# Patient Record
Sex: FEMALE | Race: WHITE | Employment: UNEMPLOYED | URBAN - METROPOLITAN AREA
[De-identification: names, ages, dates, MRNs, and addresses within clinical notes are randomized per-mention and may not be internally consistent; named-entity substitution may affect disease eponyms.]

---

## 2020-06-10 ENCOUNTER — TELEPHONE (OUTPATIENT)
Dept: OBGYN CLINIC | Facility: CLINIC | Age: 30
End: 2020-06-10

## 2020-06-10 NOTE — TELEPHONE ENCOUNTER
The patient was suppose to be seen for a Missed Menses appointment yesterday but she could not urinate in the office to save her life so we sent her for a Quant BHCG. I don't know why she did not go to the lab when she was in our office yesterday.   Now yo

## 2020-06-10 NOTE — TELEPHONE ENCOUNTER
Pt Name and  verified. Pt states that she is unable to come in today for blood work to complete an HCG. Pt is scheduled to come in tomorrow, but wants to make sure she is able to be seen even though she didn't complete the lab.  pls advise

## 2020-06-11 ENCOUNTER — HOSPITAL ENCOUNTER (OUTPATIENT)
Dept: ULTRASOUND IMAGING | Facility: HOSPITAL | Age: 30
Discharge: HOME | End: 2020-06-11
Attending: OBSTETRICS & GYNECOLOGY
Payer: MEDICAID

## 2020-06-11 ENCOUNTER — OFFICE VISIT (OUTPATIENT)
Dept: OBGYN CLINIC | Facility: CLINIC | Age: 30
End: 2020-06-11
Payer: MEDICAID

## 2020-06-11 VITALS — SYSTOLIC BLOOD PRESSURE: 110 MMHG | DIASTOLIC BLOOD PRESSURE: 62 MMHG | WEIGHT: 192 LBS

## 2020-06-11 DIAGNOSIS — N92.6 MISSED MENSES: ICD-10-CM

## 2020-06-11 DIAGNOSIS — N92.6 MISSED MENSES: Primary | ICD-10-CM

## 2020-06-11 PROCEDURE — 76801 OB US < 14 WKS SINGLE FETUS: CPT | Performed by: OBSTETRICS & GYNECOLOGY

## 2020-06-11 PROCEDURE — 81025 URINE PREGNANCY TEST: CPT | Performed by: OBSTETRICS & GYNECOLOGY

## 2020-06-11 PROCEDURE — 99203 OFFICE O/P NEW LOW 30 MIN: CPT | Performed by: OBSTETRICS & GYNECOLOGY

## 2020-06-11 RX ORDER — SWAB
SWAB, NON-MEDICATED MISCELLANEOUS
COMMUNITY
End: 2020-06-12 | Stop reason: CLARIF

## 2020-06-11 NOTE — PROGRESS NOTES
HPI:    Patient ID: Akash Genao is a 34year old female. Patient here for PCV. Discussed PNC and PNV. Optional and required screening reviewed. H/O  x 2. No risk factors identified. Patient is sure of Dates. By LMP should be 11 weks.   No FHTs

## 2020-06-12 ENCOUNTER — TELEPHONE (OUTPATIENT)
Dept: OBGYN CLINIC | Facility: CLINIC | Age: 30
End: 2020-06-12

## 2020-06-12 RX ORDER — ASCORBIC ACID, CHOLECALCIFEROL, .ALPHA.-TOCOPHEROL, DL-, PYRIDOXINE HYDROCHLORIDE, FOLIC ACID, CYANOCOBALAMIN, CALCIUM CARBONATE, FERROUS FUMARATE, MAGNESIUM OXIDE AND DOCONEXENT 90; 220; 10; 26; 1; 13; 145; 28; 50; 300 MG/1; [IU]/1; [IU]/1; MG/1; MG/1; UG/1; MG/1; MG/1; MG/1; MG/1
1 CAPSULE, GELATIN COATED ORAL DAILY
Qty: 30 CAPSULE | Refills: 11 | Status: SHIPPED | OUTPATIENT
Start: 2020-06-12 | End: 2020-07-12

## 2020-06-12 NOTE — TELEPHONE ENCOUNTER
----- Message from Craig Cortes MD sent at 6/11/2020  7:12 PM CDT -----  Patient has 9 week IUP. Patient was off on her dates. New CHRISTIN is 1/14/2021. Notify patient.

## 2020-06-12 NOTE — TELEPHONE ENCOUNTER
Pt informed of results and new CHRISTIN date, verbalized understanding. Pt inquiring on prenatal vitamins, would like Md to send some to pharmacy. Sending to provider for consideration.

## 2020-06-17 ENCOUNTER — TELEPHONE (OUTPATIENT)
Dept: OBGYN CLINIC | Facility: CLINIC | Age: 30
End: 2020-06-17

## 2020-06-23 ENCOUNTER — TELEPHONE (OUTPATIENT)
Dept: OBGYN CLINIC | Facility: CLINIC | Age: 30
End: 2020-06-23

## 2020-06-23 NOTE — TELEPHONE ENCOUNTER
Spoke with pharmacist and they were requesting clarification for PNV. Wanted to know if okay to approve for covered medications by the pt's plan. Advised okay to give pt PNV approved by plan. No further questions.

## 2020-06-24 ENCOUNTER — NURSE ONLY (OUTPATIENT)
Dept: OBGYN CLINIC | Facility: CLINIC | Age: 30
End: 2020-06-24
Payer: MEDICAID

## 2020-06-24 VITALS — BODY MASS INDEX: 33 KG/M2 | HEIGHT: 64 IN

## 2020-06-24 DIAGNOSIS — Z34.81 ENCOUNTER FOR SUPERVISION OF OTHER NORMAL PREGNANCY IN FIRST TRIMESTER: Primary | ICD-10-CM

## 2020-06-24 NOTE — PROGRESS NOTES
Pt Name and  verified. OB History     T2    L2    SAB0  TAB0  Ectopic0  Multiple0  Live Births2       Pt is here today for RN [facility] Education.     Missed menses apt with:MLM  LMP: 3/23/2020    Pre  BMI: 32.6   EPDS score: 0/30  +UPT at

## 2020-07-20 ENCOUNTER — APPOINTMENT (OUTPATIENT)
Dept: MRI IMAGING | Facility: HOSPITAL | Age: 30
DRG: 818 | End: 2020-07-20
Attending: EMERGENCY MEDICINE
Payer: MEDICAID

## 2020-07-20 ENCOUNTER — ANESTHESIA EVENT (OUTPATIENT)
Dept: SURGERY | Facility: HOSPITAL | Age: 30
DRG: 818 | End: 2020-07-20
Payer: MEDICAID

## 2020-07-20 ENCOUNTER — ANESTHESIA (OUTPATIENT)
Dept: SURGERY | Facility: HOSPITAL | Age: 30
DRG: 818 | End: 2020-07-20
Payer: MEDICAID

## 2020-07-20 ENCOUNTER — APPOINTMENT (OUTPATIENT)
Dept: ULTRASOUND IMAGING | Facility: HOSPITAL | Age: 30
DRG: 818 | End: 2020-07-20
Attending: EMERGENCY MEDICINE
Payer: MEDICAID

## 2020-07-20 ENCOUNTER — HOSPITAL ENCOUNTER (INPATIENT)
Facility: HOSPITAL | Age: 30
LOS: 1 days | Discharge: HOME | DRG: 818 | End: 2020-07-21
Attending: EMERGENCY MEDICINE | Admitting: HOSPITALIST
Payer: MEDICAID

## 2020-07-20 DIAGNOSIS — K35.80 ACUTE APPENDICITIS, UNSPECIFIED ACUTE APPENDICITIS TYPE: Primary | ICD-10-CM

## 2020-07-20 DIAGNOSIS — K35.80 ACUTE APPENDICITIS: ICD-10-CM

## 2020-07-20 LAB
ALBUMIN SERPL-MCNC: 3 G/DL (ref 3.4–5)
ALBUMIN/GLOB SERPL: 0.8 {RATIO} (ref 1–2)
ALP LIVER SERPL-CCNC: 62 U/L (ref 37–98)
ALT SERPL-CCNC: 19 U/L (ref 13–56)
ANION GAP SERPL CALC-SCNC: 8 MMOL/L (ref 0–18)
ANTIBODY SCREEN: NEGATIVE
AST SERPL-CCNC: 12 U/L (ref 15–37)
B-HCG SERPL-ACNC: ABNORMAL MIU/ML
BASOPHILS # BLD AUTO: 0.02 X10(3) UL (ref 0–0.2)
BASOPHILS NFR BLD AUTO: 0.1 %
BILIRUB SERPL-MCNC: 0.4 MG/DL (ref 0.1–2)
BILIRUB UR QL: NEGATIVE
BUN BLD-MCNC: 7 MG/DL (ref 7–18)
BUN/CREAT SERPL: 13.2 (ref 10–20)
CALCIUM BLD-MCNC: 8.4 MG/DL (ref 8.5–10.1)
CHLORIDE SERPL-SCNC: 110 MMOL/L (ref 98–112)
CO2 SERPL-SCNC: 21 MMOL/L (ref 21–32)
COLOR UR: YELLOW
CREAT BLD-MCNC: 0.53 MG/DL (ref 0.55–1.02)
DEPRECATED RDW RBC AUTO: 42.7 FL (ref 35.1–46.3)
EOSINOPHIL # BLD AUTO: 0.03 X10(3) UL (ref 0–0.7)
EOSINOPHIL NFR BLD AUTO: 0.2 %
ERYTHROCYTE [DISTWIDTH] IN BLOOD BY AUTOMATED COUNT: 12.4 % (ref 11–15)
GLOBULIN PLAS-MCNC: 3.7 G/DL (ref 2.8–4.4)
GLUCOSE BLD-MCNC: 99 MG/DL (ref 70–99)
GLUCOSE UR-MCNC: NEGATIVE MG/DL
HAV IGM SER QL: 2 MG/DL (ref 1.6–2.6)
HCT VFR BLD AUTO: 39.7 % (ref 35–48)
HGB BLD-MCNC: 14.2 G/DL (ref 12–16)
HGB UR QL STRIP.AUTO: NEGATIVE
IMM GRANULOCYTES # BLD AUTO: 0.07 X10(3) UL (ref 0–1)
IMM GRANULOCYTES NFR BLD: 0.4 %
KETONES UR-MCNC: 20 MG/DL
LEUKOCYTE ESTERASE UR QL STRIP.AUTO: NEGATIVE
LIPASE SERPL-CCNC: 43 U/L (ref 73–393)
LYMPHOCYTES # BLD AUTO: 1.55 X10(3) UL (ref 1–4)
LYMPHOCYTES NFR BLD AUTO: 9.8 %
M PROTEIN MFR SERPL ELPH: 6.7 G/DL (ref 6.4–8.2)
MCH RBC QN AUTO: 33.7 PG (ref 26–34)
MCHC RBC AUTO-ENTMCNC: 35.8 G/DL (ref 31–37)
MCV RBC AUTO: 94.3 FL (ref 80–100)
MONOCYTES # BLD AUTO: 0.54 X10(3) UL (ref 0.1–1)
MONOCYTES NFR BLD AUTO: 3.4 %
NEUTROPHILS # BLD AUTO: 13.58 X10 (3) UL (ref 1.5–7.7)
NEUTROPHILS # BLD AUTO: 13.58 X10(3) UL (ref 1.5–7.7)
NEUTROPHILS NFR BLD AUTO: 86.1 %
NITRITE UR QL STRIP.AUTO: NEGATIVE
OSMOLALITY SERPL CALC.SUM OF ELEC: 286 MOSM/KG (ref 275–295)
PH UR: 6 [PH] (ref 5–8)
PLATELET # BLD AUTO: 218 10(3)UL (ref 150–450)
POTASSIUM SERPL-SCNC: 3.4 MMOL/L (ref 3.5–5.1)
PROT UR-MCNC: NEGATIVE MG/DL
RBC # BLD AUTO: 4.21 X10(6)UL (ref 3.8–5.3)
RH BLOOD TYPE: POSITIVE
SARS-COV-2 RNA RESP QL NAA+PROBE: NOT DETECTED
SODIUM SERPL-SCNC: 139 MMOL/L (ref 136–145)
SP GR UR STRIP: 1.01 (ref 1–1.03)
UROBILINOGEN UR STRIP-ACNC: <2
WBC # BLD AUTO: 15.8 X10(3) UL (ref 4–11)

## 2020-07-20 PROCEDURE — 0DTJ4ZZ RESECTION OF APPENDIX, PERCUTANEOUS ENDOSCOPIC APPROACH: ICD-10-PCS | Performed by: SURGERY

## 2020-07-20 PROCEDURE — 99222 1ST HOSP IP/OBS MODERATE 55: CPT | Performed by: OBSTETRICS & GYNECOLOGY

## 2020-07-20 PROCEDURE — 99222 1ST HOSP IP/OBS MODERATE 55: CPT | Performed by: HOSPITALIST

## 2020-07-20 PROCEDURE — 76815 OB US LIMITED FETUS(S): CPT | Performed by: EMERGENCY MEDICINE

## 2020-07-20 PROCEDURE — 74181 MRI ABDOMEN W/O CONTRAST: CPT | Performed by: EMERGENCY MEDICINE

## 2020-07-20 PROCEDURE — 72195 MRI PELVIS W/O DYE: CPT | Performed by: EMERGENCY MEDICINE

## 2020-07-20 RX ORDER — NALOXONE HYDROCHLORIDE 0.4 MG/ML
80 INJECTION, SOLUTION INTRAMUSCULAR; INTRAVENOUS; SUBCUTANEOUS AS NEEDED
Status: DISCONTINUED | OUTPATIENT
Start: 2020-07-20 | End: 2020-07-20 | Stop reason: HOSPADM

## 2020-07-20 RX ORDER — HYDROCODONE BITARTRATE AND ACETAMINOPHEN 5; 325 MG/1; MG/1
1 TABLET ORAL AS NEEDED
Status: DISCONTINUED | OUTPATIENT
Start: 2020-07-20 | End: 2020-07-20 | Stop reason: HOSPADM

## 2020-07-20 RX ORDER — DEXAMETHASONE SODIUM PHOSPHATE 4 MG/ML
VIAL (ML) INJECTION AS NEEDED
Status: DISCONTINUED | OUTPATIENT
Start: 2020-07-20 | End: 2020-07-20 | Stop reason: SURG

## 2020-07-20 RX ORDER — ONDANSETRON 2 MG/ML
4 INJECTION INTRAMUSCULAR; INTRAVENOUS EVERY 6 HOURS PRN
Status: DISCONTINUED | OUTPATIENT
Start: 2020-07-20 | End: 2020-07-21

## 2020-07-20 RX ORDER — MORPHINE SULFATE 4 MG/ML
4 INJECTION, SOLUTION INTRAMUSCULAR; INTRAVENOUS EVERY 10 MIN PRN
Status: DISCONTINUED | OUTPATIENT
Start: 2020-07-20 | End: 2020-07-20 | Stop reason: HOSPADM

## 2020-07-20 RX ORDER — METOCLOPRAMIDE HYDROCHLORIDE 5 MG/ML
10 INJECTION INTRAMUSCULAR; INTRAVENOUS EVERY 8 HOURS PRN
Status: DISCONTINUED | OUTPATIENT
Start: 2020-07-20 | End: 2020-07-21

## 2020-07-20 RX ORDER — SODIUM CHLORIDE 0.9 % (FLUSH) 0.9 %
3 SYRINGE (ML) INJECTION AS NEEDED
Status: DISCONTINUED | OUTPATIENT
Start: 2020-07-20 | End: 2020-07-21

## 2020-07-20 RX ORDER — SODIUM CHLORIDE, SODIUM LACTATE, POTASSIUM CHLORIDE, CALCIUM CHLORIDE 600; 310; 30; 20 MG/100ML; MG/100ML; MG/100ML; MG/100ML
INJECTION, SOLUTION INTRAVENOUS CONTINUOUS PRN
Status: DISCONTINUED | OUTPATIENT
Start: 2020-07-20 | End: 2020-07-20 | Stop reason: SURG

## 2020-07-20 RX ORDER — SODIUM CHLORIDE, SODIUM LACTATE, POTASSIUM CHLORIDE, CALCIUM CHLORIDE 600; 310; 30; 20 MG/100ML; MG/100ML; MG/100ML; MG/100ML
INJECTION, SOLUTION INTRAVENOUS CONTINUOUS
Status: DISCONTINUED | OUTPATIENT
Start: 2020-07-20 | End: 2020-07-20 | Stop reason: HOSPADM

## 2020-07-20 RX ORDER — PROCHLORPERAZINE EDISYLATE 5 MG/ML
5 INJECTION INTRAMUSCULAR; INTRAVENOUS ONCE AS NEEDED
Status: DISCONTINUED | OUTPATIENT
Start: 2020-07-20 | End: 2020-07-20 | Stop reason: HOSPADM

## 2020-07-20 RX ORDER — ENOXAPARIN SODIUM 100 MG/ML
40 INJECTION SUBCUTANEOUS DAILY
Status: DISCONTINUED | OUTPATIENT
Start: 2020-07-20 | End: 2020-07-21

## 2020-07-20 RX ORDER — HEPARIN SODIUM 1000 [USP'U]/ML
INJECTION, SOLUTION INTRAVENOUS; SUBCUTANEOUS AS NEEDED
Status: DISCONTINUED | OUTPATIENT
Start: 2020-07-20 | End: 2020-07-20 | Stop reason: HOSPADM

## 2020-07-20 RX ORDER — MORPHINE SULFATE 4 MG/ML
4 INJECTION, SOLUTION INTRAMUSCULAR; INTRAVENOUS EVERY 2 HOUR PRN
Status: DISCONTINUED | OUTPATIENT
Start: 2020-07-20 | End: 2020-07-21

## 2020-07-20 RX ORDER — DEXTROSE, SODIUM CHLORIDE, SODIUM LACTATE, POTASSIUM CHLORIDE, AND CALCIUM CHLORIDE 5; .6; .31; .03; .02 G/100ML; G/100ML; G/100ML; G/100ML; G/100ML
INJECTION, SOLUTION INTRAVENOUS CONTINUOUS
Status: DISCONTINUED | OUTPATIENT
Start: 2020-07-20 | End: 2020-07-21

## 2020-07-20 RX ORDER — GLYCOPYRROLATE 0.2 MG/ML
INJECTION, SOLUTION INTRAMUSCULAR; INTRAVENOUS AS NEEDED
Status: DISCONTINUED | OUTPATIENT
Start: 2020-07-20 | End: 2020-07-20 | Stop reason: SURG

## 2020-07-20 RX ORDER — ONDANSETRON 2 MG/ML
4 INJECTION INTRAMUSCULAR; INTRAVENOUS ONCE AS NEEDED
Status: DISCONTINUED | OUTPATIENT
Start: 2020-07-20 | End: 2020-07-20 | Stop reason: HOSPADM

## 2020-07-20 RX ORDER — MORPHINE SULFATE 2 MG/ML
1 INJECTION, SOLUTION INTRAMUSCULAR; INTRAVENOUS EVERY 2 HOUR PRN
Status: DISCONTINUED | OUTPATIENT
Start: 2020-07-20 | End: 2020-07-21

## 2020-07-20 RX ORDER — HYDROMORPHONE HYDROCHLORIDE 1 MG/ML
0.6 INJECTION, SOLUTION INTRAMUSCULAR; INTRAVENOUS; SUBCUTANEOUS EVERY 5 MIN PRN
Status: DISCONTINUED | OUTPATIENT
Start: 2020-07-20 | End: 2020-07-20 | Stop reason: HOSPADM

## 2020-07-20 RX ORDER — LIDOCAINE HYDROCHLORIDE 10 MG/ML
INJECTION, SOLUTION EPIDURAL; INFILTRATION; INTRACAUDAL; PERINEURAL AS NEEDED
Status: DISCONTINUED | OUTPATIENT
Start: 2020-07-20 | End: 2020-07-20 | Stop reason: SURG

## 2020-07-20 RX ORDER — CHOLECALCIFEROL (VITAMIN D3) 25 MCG
1 TABLET,CHEWABLE ORAL DAILY
Status: DISCONTINUED | OUTPATIENT
Start: 2020-07-20 | End: 2020-07-21

## 2020-07-20 RX ORDER — .BETA.-CAROTENE, ASCORBIC ACID, CHOLECALCIFEROL, .ALPHA.-TOCOPHEROL ACETATE, DL-, THIAMINE, RIBOFLAVIN, NIACINAMIDE, PYRIDOXINE HYDROCHLORIDE, FOLIC ACID, CYANOCOBALAMIN, CALCIUM PANTOTHENATE, CALCIUM CARBONATE, FERROUS FUMARATE, ZINC OXIDE AND DOCUSATE SODIUM 1000; 100; 400; 30; 3; 3; 15; 20; 1; 12; 7; 200; 29; 20; 25 [IU]/1; MG/1; [IU]/1; MG/1; MG/1; MG/1; MG/1; MG/1; MG/1; UG/1; MG/1; MG/1; MG/1; MG/1; MG/1
1 TABLET ORAL DAILY
COMMUNITY
Start: 2020-06-23 | End: 2021-01-11

## 2020-07-20 RX ORDER — HYDROMORPHONE HYDROCHLORIDE 1 MG/ML
0.4 INJECTION, SOLUTION INTRAMUSCULAR; INTRAVENOUS; SUBCUTANEOUS EVERY 5 MIN PRN
Status: DISCONTINUED | OUTPATIENT
Start: 2020-07-20 | End: 2020-07-20 | Stop reason: HOSPADM

## 2020-07-20 RX ORDER — ACETAMINOPHEN 325 MG/1
650 TABLET ORAL EVERY 4 HOURS PRN
Status: DISCONTINUED | OUTPATIENT
Start: 2020-07-20 | End: 2020-07-21

## 2020-07-20 RX ORDER — FAMOTIDINE 10 MG/ML
20 INJECTION, SOLUTION INTRAVENOUS ONCE
Status: COMPLETED | OUTPATIENT
Start: 2020-07-20 | End: 2020-07-20

## 2020-07-20 RX ORDER — MORPHINE SULFATE 4 MG/ML
2 INJECTION, SOLUTION INTRAMUSCULAR; INTRAVENOUS EVERY 10 MIN PRN
Status: DISCONTINUED | OUTPATIENT
Start: 2020-07-20 | End: 2020-07-20 | Stop reason: HOSPADM

## 2020-07-20 RX ORDER — ROCURONIUM BROMIDE 10 MG/ML
INJECTION, SOLUTION INTRAVENOUS AS NEEDED
Status: DISCONTINUED | OUTPATIENT
Start: 2020-07-20 | End: 2020-07-20 | Stop reason: SURG

## 2020-07-20 RX ORDER — HYDROCODONE BITARTRATE AND ACETAMINOPHEN 5; 325 MG/1; MG/1
2 TABLET ORAL AS NEEDED
Status: DISCONTINUED | OUTPATIENT
Start: 2020-07-20 | End: 2020-07-20 | Stop reason: HOSPADM

## 2020-07-20 RX ORDER — NEOSTIGMINE METHYLSULFATE 1 MG/ML
INJECTION INTRAVENOUS AS NEEDED
Status: DISCONTINUED | OUTPATIENT
Start: 2020-07-20 | End: 2020-07-20 | Stop reason: SURG

## 2020-07-20 RX ORDER — BUPIVACAINE HYDROCHLORIDE AND EPINEPHRINE 2.5; 5 MG/ML; UG/ML
INJECTION, SOLUTION INFILTRATION; PERINEURAL AS NEEDED
Status: DISCONTINUED | OUTPATIENT
Start: 2020-07-20 | End: 2020-07-20 | Stop reason: HOSPADM

## 2020-07-20 RX ORDER — DEXTROSE AND SODIUM CHLORIDE 5; .45 G/100ML; G/100ML
INJECTION, SOLUTION INTRAVENOUS CONTINUOUS
Status: ACTIVE | OUTPATIENT
Start: 2020-07-20 | End: 2020-07-20

## 2020-07-20 RX ORDER — MORPHINE SULFATE 10 MG/ML
6 INJECTION, SOLUTION INTRAMUSCULAR; INTRAVENOUS EVERY 10 MIN PRN
Status: DISCONTINUED | OUTPATIENT
Start: 2020-07-20 | End: 2020-07-20 | Stop reason: HOSPADM

## 2020-07-20 RX ORDER — HYDROCODONE BITARTRATE AND ACETAMINOPHEN 5; 325 MG/1; MG/1
2 TABLET ORAL EVERY 4 HOURS PRN
Status: DISCONTINUED | OUTPATIENT
Start: 2020-07-20 | End: 2020-07-21

## 2020-07-20 RX ORDER — HYDROCODONE BITARTRATE AND ACETAMINOPHEN 5; 325 MG/1; MG/1
1 TABLET ORAL EVERY 4 HOURS PRN
Status: DISCONTINUED | OUTPATIENT
Start: 2020-07-20 | End: 2020-07-21

## 2020-07-20 RX ORDER — METOCLOPRAMIDE HYDROCHLORIDE 5 MG/ML
10 INJECTION INTRAMUSCULAR; INTRAVENOUS ONCE
Status: COMPLETED | OUTPATIENT
Start: 2020-07-20 | End: 2020-07-20

## 2020-07-20 RX ORDER — ONDANSETRON 2 MG/ML
INJECTION INTRAMUSCULAR; INTRAVENOUS AS NEEDED
Status: DISCONTINUED | OUTPATIENT
Start: 2020-07-20 | End: 2020-07-20 | Stop reason: SURG

## 2020-07-20 RX ORDER — HYDROMORPHONE HYDROCHLORIDE 1 MG/ML
0.2 INJECTION, SOLUTION INTRAMUSCULAR; INTRAVENOUS; SUBCUTANEOUS EVERY 5 MIN PRN
Status: DISCONTINUED | OUTPATIENT
Start: 2020-07-20 | End: 2020-07-20 | Stop reason: HOSPADM

## 2020-07-20 RX ORDER — MORPHINE SULFATE 2 MG/ML
2 INJECTION, SOLUTION INTRAMUSCULAR; INTRAVENOUS EVERY 2 HOUR PRN
Status: DISCONTINUED | OUTPATIENT
Start: 2020-07-20 | End: 2020-07-21

## 2020-07-20 RX ADMIN — LIDOCAINE HYDROCHLORIDE 50 MG: 10 INJECTION, SOLUTION EPIDURAL; INFILTRATION; INTRACAUDAL; PERINEURAL at 13:41:00

## 2020-07-20 RX ADMIN — SODIUM CHLORIDE, SODIUM LACTATE, POTASSIUM CHLORIDE, CALCIUM CHLORIDE: 600; 310; 30; 20 INJECTION, SOLUTION INTRAVENOUS at 13:40:00

## 2020-07-20 RX ADMIN — GLYCOPYRROLATE 0.2 MG: 0.2 INJECTION, SOLUTION INTRAMUSCULAR; INTRAVENOUS at 13:36:00

## 2020-07-20 RX ADMIN — ONDANSETRON 4 MG: 2 INJECTION INTRAMUSCULAR; INTRAVENOUS at 14:22:00

## 2020-07-20 RX ADMIN — SODIUM CHLORIDE, SODIUM LACTATE, POTASSIUM CHLORIDE, CALCIUM CHLORIDE: 600; 310; 30; 20 INJECTION, SOLUTION INTRAVENOUS at 13:37:00

## 2020-07-20 RX ADMIN — NEOSTIGMINE METHYLSULFATE 3.5 MG: 1 INJECTION INTRAVENOUS at 14:27:00

## 2020-07-20 RX ADMIN — DEXAMETHASONE SODIUM PHOSPHATE 8 MG: 4 MG/ML VIAL (ML) INJECTION at 13:52:00

## 2020-07-20 RX ADMIN — GLYCOPYRROLATE 0.7 MG: 0.2 INJECTION, SOLUTION INTRAMUSCULAR; INTRAVENOUS at 14:27:00

## 2020-07-20 RX ADMIN — ROCURONIUM BROMIDE 30 MG: 10 INJECTION, SOLUTION INTRAVENOUS at 13:43:00

## 2020-07-20 NOTE — ED NOTES
Orders for admission. Patient and/or next of kin aware of plan and is ready to go upstairs. Please call ED RN at listed extension should you have any further questions or concerns. Thank you.     Nurse and Najma Morton    Chief Presentation: ABDOMINAL PA

## 2020-07-20 NOTE — ED NOTES
Orders for admission, patient is aware of plan and ready to go upstairs. Any questions, please call ED RN Hair Arteaga  at extension [phone]. Pt is New Zealander speaking only.   Potassium Iv slowed down due to pt stating she is feeling pain, IV flush without difficulty

## 2020-07-20 NOTE — OPERATIVE REPORT
OPERATIVE REPORT:     PATIENT NAME: Akash Genao  : 1990   CSN: [id number]    DATE OF OPERATION:   20    PREOPERATIVE DIAGNOSIS: Acute appendicitis    POSTOPERATIVE DIAGNOSIS: Acute appendicitis     PROCEDURE PERFORMED: Laparoscopic appendecto ports were removed under direct visualization and then the umbilical fascial incision was closed using 3 simple interrupted 0 Vicryl sutures.   Local anesthetic was infiltrated at the fascial level the wounds were irrigated and closed using 4-0 Vicryl subic

## 2020-07-20 NOTE — CONSULTS
[address]  ZWX:60/01/0723  [facility]:[id number]  LOS:0    Date of Admission:  7/20/2020  Date of Consult:  7/20/2020     Rosario Gonzalez HYDROcodone-acetaminophen (NORCO) 5-325 MG per tab 2 tablet, 2 tablet, Oral, Q4H PRN  •  morphINE sulfate (PF) 2 MG/ML injection 1 mg, 1 mg, Intravenous, Q2H PRN **OR** morphINE sulfate (PF) 2 MG/ML injection 2 mg, 2 mg, Intravenous, Q2H PRN **OR** morphIN 07/20/2020    .0 07/20/2020     07/20/2020    K 3.4 07/20/2020     07/20/2020    CO2 21.0 07/20/2020    BUN 7 07/20/2020    CREATSERUM 0.53 07/20/2020    GLU 99 07/20/2020    MG 2.0 07/20/2020    BILT 0.4 07/20/2020    AST 12 07/20/2020 safe than nonsurgical treatment. He understood risk for premature labor and spontaneous  with either treatment.   He understood that there is no risk of birth defects with surgery in second trimester pregnancy, although risk of birth defects still

## 2020-07-20 NOTE — ANESTHESIA POSTPROCEDURE EVALUATION
Patient: Rowan Campos    Procedure Summary     Date:  07/20/20 Room / Location:  [facility] OR [address] [place] [address] OR    Anesthesia Start:  7771 Anesthesia Stop:  6449    Procedure:  LAPAROSCOPIC APPENDECTOMY (N/A Abdomen) Diagnosis:       Acute appendicitis      (

## 2020-07-20 NOTE — PLAN OF CARE
Pt received from ED around 0900 and went for lap appendectomy this afternoon. Pt is primarily [address] speaking. Pt is A&Ox4, VSS on room air. Complaining of mild to moderate pain throughout the day, declined medication.  IV fluids infusing, scheduled zosyn evaluate response  - Consider cultural and social influences on pain and pain management  - Manage/alleviate anxiety  - Utilize distraction and/or relaxation techniques  - Monitor for opioid side effects  - Notify MD/LIP if interventions unsuccessful or pa Minimal or absence of nausea and vomiting  Description  INTERVENTIONS:  - Maintain adequate hydration with IV or PO as ordered and tolerated  - Nasogastric tube to low intermittent suction as ordered  - Evaluate effectiveness of ordered antiemetic medicati

## 2020-07-20 NOTE — ANESTHESIA PREPROCEDURE EVALUATION
Anesthesia PreOp Note    HPI:     Ashley Pérez is a 34year old female who presents for preoperative consultation requested by: Pedro Hess MD    Date of Surgery: 7/20/2020    Procedure(s):  LAPAROSCOPIC APPENDECTOMY  Indication: Acute appendicitis [ (PF) 2 MG/ML injection 2 mg, 2 mg, Intravenous, Q2H PRN, Eri Hull MD    Or  [facility] Hold] morphINE sulfate (PF) 4 MG/ML injection 4 mg, 4 mg, Intravenous, Q2H PRN, Eri Almeida MD  [MAR Hold] ondansetron HCl (ZOFRAN) injection 4 mg, 4 mg, Intraven Not on file        Physically abused: Not on file        Forced sexual activity: Not on file    Other Topics      Concerns:        Not on file    Social History Narrative      Not on file      Available pre-op labs reviewed.   Lab Results   Component Value ASA:  2  Emergent    Plan:   General  Airway:  ETT and Video laryngoscope  Plan Comments: 14 weeks pregnant. Pre and post-op FHTs. Avoid midazolam, toradol. Videolaryngoscopy + RSI. Plan interpreted by pre-op bedside nurse.   Informed Consent Plan and

## 2020-07-20 NOTE — ED INITIAL ASSESSMENT (HPI)
Pt arrives via ems with complaints of mid-abdominal pain that began 5 hours pta along with n/d. Pt states the pain radiates bilaterally in the lower abdomen. Pt is presently pregnant approximately 13-14 weeks with her 3rd pregnancy.  Pt is guarding abdomen

## 2020-07-20 NOTE — ANESTHESIA PROCEDURE NOTES
Airway  Date/Time: 7/20/2020 1:50 PM  Urgency: Elective    Airway not difficult    General Information and Staff    Patient location during procedure: OR  Anesthesiologist: Marsha Kincaid MD  Performed: anesthesiologist     Indications and Patient Conditi

## 2020-07-20 NOTE — H&P
[address] Patient Status:  Inpatient    1990 MRN [id number]   Location [facility] 4W/SW/SE Attending Wilfred Munoz MD   Hosp Day # 0 PCP Edis Escoto MD     Date:  2020  [facility] Calm and cooperative   HEENT:  Head was atraumatic and normocephalic. Eyes:  Extraocular muscles were intact. Sclera was anicteric. Pupils were equally reactive to light. Ears: There were no lesions. Nose:  No lesions were noted. Throat:   There was appendicitis with trace periappendiceal fluid. 2. 2.4 cm gallstone. 3. Few tiny hepatic cysts. No major discrepancy with preliminary [facility] radiology report.   Dictated by (CST): Izzy Menjivar MD on 7/20/2020 at 7:22 AM     Finalized by (CST): Ama Pulido

## 2020-07-20 NOTE — PROGRESS NOTES
[facility]    Obstetric ER Problem    Ian Forde Patient Status:  Emergency    1990 MRN [id number]   Location [address] Attending Korin Rhoades MD   Hosp Day # 0 PCP Eryn Hastings MD     Date of history.     Past OB History:  OB History    Para Term  AB Living   3 2 2     2   SAB TAB Ectopic Multiple Live Births           2      # Outcome Date GA Lbr Bobo/2nd Weight Sex Delivery Anes PTL Lv   3 Current            2 Term 17   9 lb Right: No inguinal adenopathy present. Left: No inguinal adenopathy present. Neurological: She is alert. Skin: Skin is warm.    Psychiatric: Her behavior is normal. Judgment normal.       Results:   Diagnostics:  Mri Abdomen/pelvis  (cpt=74181/72

## 2020-07-20 NOTE — ED PROVIDER NOTES
Patient Seen in: [facility] AND [facility] Emergency Department    History   Patient presents with:  Abdomen/Flank Pain  Pregnancy Issues  Nausea/Vomiting/Diarrhea    Stated Complaint: ABDOMINAL PAIN, PREGNANT     HPI    41-year-old female currently  approxima Exam   Constitutional: No distress. HEENT: MMM. Head: Normocephalic. Eyes: No injection. Cardiovascular: RRR. Pulmonary/Chest: Effort normal. CTAB. Abdominal: Soft. Gravid. Mild diffuse mid mid-abdominal tenderness without peritonitis.   Musculo 07/20/2020  Patient No:  [id number]  Physician:  Tressa Bowles  YOB: 1990    Past Medical History (entered by Technologist):    Reason For Exam (entered by Technologist):  MID ABD PAIN  Other Notes (entered by Technologist): SINGLE V [id number]  Physician:  Memo Maher  YOB: 1990    Past Medical History (entered by Technologist):    Reason For Exam (entered by Technologist):  13-15 weeks pregnancy  Other Notes (entered by Technologist):  Abdominal pain    Additio eventually with RLQ localization. Ultrasound without acute obstetric pathology, MRI notable for uncomplicated appendicitis for which Zosyn initiated.   Case discussed with OB coverage Dr. Terese Donovan for Dr. Kelsie Feredman - in agreement with antibiotics and need for o

## 2020-07-21 VITALS
HEIGHT: 64 IN | BODY MASS INDEX: 33.44 KG/M2 | RESPIRATION RATE: 18 BRPM | HEART RATE: 83 BPM | TEMPERATURE: 99 F | OXYGEN SATURATION: 100 % | DIASTOLIC BLOOD PRESSURE: 61 MMHG | WEIGHT: 195.88 LBS | SYSTOLIC BLOOD PRESSURE: 95 MMHG

## 2020-07-21 LAB
ANION GAP SERPL CALC-SCNC: 8 MMOL/L (ref 0–18)
BASOPHILS # BLD AUTO: 0.01 X10(3) UL (ref 0–0.2)
BASOPHILS NFR BLD AUTO: 0.1 %
BUN BLD-MCNC: 6 MG/DL (ref 7–18)
BUN/CREAT SERPL: 11.8 (ref 10–20)
CALCIUM BLD-MCNC: 8 MG/DL (ref 8.5–10.1)
CHLORIDE SERPL-SCNC: 110 MMOL/L (ref 98–112)
CO2 SERPL-SCNC: 22 MMOL/L (ref 21–32)
CREAT BLD-MCNC: 0.51 MG/DL (ref 0.55–1.02)
DEPRECATED RDW RBC AUTO: 42.5 FL (ref 35.1–46.3)
EOSINOPHIL # BLD AUTO: 0 X10(3) UL (ref 0–0.7)
EOSINOPHIL NFR BLD AUTO: 0 %
ERYTHROCYTE [DISTWIDTH] IN BLOOD BY AUTOMATED COUNT: 12.4 % (ref 11–15)
GLUCOSE BLD-MCNC: 124 MG/DL (ref 70–99)
HCT VFR BLD AUTO: 37 % (ref 35–48)
HGB BLD-MCNC: 13 G/DL (ref 12–16)
IMM GRANULOCYTES # BLD AUTO: 0.05 X10(3) UL (ref 0–1)
IMM GRANULOCYTES NFR BLD: 0.4 %
LYMPHOCYTES # BLD AUTO: 1.42 X10(3) UL (ref 1–4)
LYMPHOCYTES NFR BLD AUTO: 12.4 %
MCH RBC QN AUTO: 33.6 PG (ref 26–34)
MCHC RBC AUTO-ENTMCNC: 35.1 G/DL (ref 31–37)
MCV RBC AUTO: 95.6 FL (ref 80–100)
MONOCYTES # BLD AUTO: 0.48 X10(3) UL (ref 0.1–1)
MONOCYTES NFR BLD AUTO: 4.2 %
NEUTROPHILS # BLD AUTO: 9.52 X10 (3) UL (ref 1.5–7.7)
NEUTROPHILS # BLD AUTO: 9.52 X10(3) UL (ref 1.5–7.7)
NEUTROPHILS NFR BLD AUTO: 82.9 %
OSMOLALITY SERPL CALC.SUM OF ELEC: 289 MOSM/KG (ref 275–295)
PLATELET # BLD AUTO: 219 10(3)UL (ref 150–450)
POTASSIUM SERPL-SCNC: 3.7 MMOL/L (ref 3.5–5.1)
RBC # BLD AUTO: 3.87 X10(6)UL (ref 3.8–5.3)
SODIUM SERPL-SCNC: 140 MMOL/L (ref 136–145)
WBC # BLD AUTO: 11.5 X10(3) UL (ref 4–11)

## 2020-07-21 PROCEDURE — 99239 HOSP IP/OBS DSCHRG MGMT >30: CPT | Performed by: HOSPITALIST

## 2020-07-21 PROCEDURE — 99231 SBSQ HOSP IP/OBS SF/LOW 25: CPT | Performed by: OBSTETRICS & GYNECOLOGY

## 2020-07-21 RX ORDER — AMOXICILLIN AND CLAVULANATE POTASSIUM 875; 125 MG/1; MG/1
1 TABLET, FILM COATED ORAL 2 TIMES DAILY
Qty: 6 TABLET | Refills: 0 | Status: SHIPPED | OUTPATIENT
Start: 2020-07-21 | End: 2020-07-24

## 2020-07-21 RX ORDER — HYDROCODONE BITARTRATE AND ACETAMINOPHEN 5; 325 MG/1; MG/1
1 TABLET ORAL EVERY 4 HOURS PRN
Qty: 5 TABLET | Refills: 0 | Status: SHIPPED | OUTPATIENT
Start: 2020-07-21 | End: 2020-07-28

## 2020-07-21 NOTE — PROGRESS NOTES
[facility]    OB/GYNE Progress Note      Sade Cordova Patient Status:  Inpatient    1990 MRN [id number]   Location [facility] 4W/SW/SE Attending Jay Gamboa MD   Hosp Day # 1 PCP Shefali Conroy MD       Subjective Darrel Chiu MD  7/21/2020  9:24 AM

## 2020-07-21 NOTE — PLAN OF CARE
Tolerating general diet, no nausea. Passing gas. Lap sites clean/dry/intact. Walking independently. IVF infusing, zosyn. Has mild abdominal pain, declining pain medications. FBC RN evaluated fetal heart tones, WNL. Ok to discharge home today.  Discharge

## 2020-07-21 NOTE — PROGRESS NOTES
[facility]    General Surgery Progress Note  Alexander Isaac  : 1990  CSN: [id number]  HD# 1    Subjective:   POD#1 laparoscopic appendectomy   Denies complaints.  denies abdominal pain, nausea and vomiting  Passing flatus     Exam: 3.7 07/21/2020     07/21/2020    CO2 22.0 07/21/2020    BUN 6 07/21/2020    CREATSERUM 0.51 07/21/2020     07/21/2020    CA 8.0 07/21/2020       Us Pregnancy Ltd (cpt=76815)    Result Date: 7/20/2020  CONCLUSION:  1.  Single live fetus  breech Loss

## 2020-07-21 NOTE — PLAN OF CARE
Problem: Patient/Family Goals  Goal: Patient/Family Long Term Goal  Description  Patient's Long Term Goal: Return home    Interventions:  - Potential surgery; surgery on consult  - Pain control  - N/V control  - See additional Care Plan goals for specifi Assess pt frequently for physical needs  - Identify cognitive and physical deficits and behaviors that affect risk of falls.   - [place] fall precautions as indicated by assessment.  - Educate pt/family on patient safety including physical limitations  - returns to baseline bowel function  Description  INTERVENTIONS:  - Assess bowel function  - Maintain adequate hydration with IV or PO as ordered and tolerated  - Evaluate effectiveness of GI medications  - Encourage mobilization and activity  - Obtain nutr

## 2020-07-21 NOTE — PROGRESS NOTES
Doppler used at bedside to auscultate fetal heart tones: 152 bpm with moderate variability, no decels auscultated.

## 2020-07-22 NOTE — DISCHARGE SUMMARY
Mikayla Ramires 44 NAME: Nella Molina PHYSICIAN: Elian Miranda MD   PATIENT ACCOUNT#:   [id number]    LOCATION:  [address] [place] [address] RECORD #:   [id number]       YOB: 1990  ADMISSION DATE:       07/20/202 pressure 96/55, saturating at 94% on room air. GENERAL:  The patient lying in bed, appears to be in no acute distress at this time. She is A and O x3. HEENT:  Extraocular movements are intact.   Pupils equal, round, and reactive to light and accommodatio

## 2020-07-22 NOTE — PAYOR COMM NOTE
--------------  ADMISSION REVIEW     Beto Ulrich #:  [id number]  Authorization Number: [id number]    Admit date: 7/20/20  Admit time: 1412       Admitting Physician: Akilah Eason MD  Attending Physician:  No att. providers found  Primar All other systems reviewed and negative except as noted above. PSFH elements reviewed from today and agreed except as otherwise stated in HPI.     Physical Exam     ED Triage Vitals [07/20/20 0320]   /68   Pulse 85   Resp 17   Temp 99.1 °F (37.3 °C) Placenta is anterior with incidental posterior contraction. Cervix is unremarkable. Amniotic fluid is within normal limits. No adnexal mass. No acute findings.   Noncontrast MRI of the abdomen and pelvis  IMPRESSION:  Acute appendicitis, appendix is a I was wearing at minimum a facemask and eye protection throughout this encounter with handwashing performed prior and after patient evaluation without personal hand/facial/oropharyngeal contact and gloves worn throughout encounter.  See note and/or contact Blood pressure 100/66, pulse 91, temperature 98.7 °F (37.1 °C), temperature source Oral, resp.  rate 16, height 5' 4\" (1.626 m), weight 195 lb 14.4 oz (88.9 kg), last menstrual period 03/23/2020, SpO2 100 %     GENERAL:  The patient appeared to be in no di CONCLUSION:  1. Single live fetus  breech presentation approximately 15 weeks 3 days without complication. 2. Normal progression of growth from June 11, 2020. 3. A preliminary report was submitted and there is agreement without major discrepancies. The patient was brought to the operating room and was induced under general anesthesia. The abdomen was prepped and draped in the usual sterile fashion.   Local anesthesia was infiltrated in the umbilicus and an intra umbilical incision was made, the fasci HGB 14.2 13.0   HCT 39.7 37.0   MCV 94.3 95.6   MCH 33.7 33.6   MCHC 35.8 35.1   RDW 12.4 12.4   NEPRELIM 13.58* 9.52*   WBC 15.8* 11.5*   .0 219.0     GLU 99 124*   BUN 7 6*   CREATSERUM 0.53* 0.51*   GFRAA 148 150   GFRNAA 129 130   CA 8.4* 8.0* Piperacillin Sod-Tazobactam So (ZOSYN) 3.375 g in dextrose 5 % 100 mL ADD-vantage   Dose: 3.375 g  Freq: Every 8 hours Route: IV  Last Dose: 3.375 g (07/21/20 0528)  Start: 07/20/20 1300 End: 07/21/20 1633    Order specific questions:    Which infection is 1036-Given   1633-D/C'd            bupivacaine 0.25%-EPINEPHrine 1:200,000 (MARCAINE/EPINEPHRINE) injection   Freq: As needed  Start: 07/20/20 1428 End: 07/20/20 1445    1428-Given   1445-D/C'd             fentaNYL citrate (SUBLIMAZE) 0.05 MG/ML injection

## 2020-07-22 NOTE — PAYOR COMM NOTE
--------------  DISCHARGE REVIEW    Mariana Mancia #:  [id number]  Authorization Number: [id number]    Admit date: 7/20/20  Admit time:  3236  Discharge Date: 7/21/2020  2:32 PM     Admitting Physician: Loretta Blancas MD  Attending Physician been deemed stable to be discharged home. She will receive a 3-day course of Augmentin. She will also follow up with her OB within 1 week. All of the above information was given to the patient. She verbalized understanding of the information given.   Fo

## 2020-07-29 ENCOUNTER — TELEPHONE (OUTPATIENT)
Dept: OBGYN CLINIC | Facility: CLINIC | Age: 30
End: 2020-07-29

## 2020-07-29 NOTE — TELEPHONE ENCOUNTER
07/29/20 [facility], for pt to call back. Pt has over due PN labs that are pending from her Nurse education. Pt needs to go to the lab as soon as possible.

## 2020-07-30 NOTE — TELEPHONE ENCOUNTER
Pt Name and  verified. Pt informed that she needs to complete her OB initial lab work. States that she had an issue with her insurance and had to fix it in order for her to be able to complete her lab work.  Pt also scheduled her New OB apt with TRINIDAD mackay

## 2020-08-01 ENCOUNTER — LAB ENCOUNTER (OUTPATIENT)
Dept: LAB | Age: 30
End: 2020-08-01
Attending: OBSTETRICS & GYNECOLOGY
Payer: MEDICAID

## 2020-08-01 DIAGNOSIS — Z34.81 ENCOUNTER FOR SUPERVISION OF OTHER NORMAL PREGNANCY IN FIRST TRIMESTER: ICD-10-CM

## 2020-08-01 DIAGNOSIS — N92.6 MISSED MENSES: ICD-10-CM

## 2020-08-01 LAB
ANTIBODY SCREEN: NEGATIVE
B-HCG SERPL-ACNC: ABNORMAL MIU/ML
BASOPHILS # BLD AUTO: 0.02 X10(3) UL (ref 0–0.2)
BASOPHILS NFR BLD AUTO: 0.2 %
DEPRECATED RDW RBC AUTO: 43.8 FL (ref 35.1–46.3)
EOSINOPHIL # BLD AUTO: 0.05 X10(3) UL (ref 0–0.7)
EOSINOPHIL NFR BLD AUTO: 0.6 %
ERYTHROCYTE [DISTWIDTH] IN BLOOD BY AUTOMATED COUNT: 12.4 % (ref 11–15)
GLUCOSE 1H P GLC SERPL-MCNC: 124 MG/DL
HBV SURFACE AG SER-ACNC: <0.1 [IU]/L
HBV SURFACE AG SERPL QL IA: NONREACTIVE
HCT VFR BLD AUTO: 39.6 % (ref 35–48)
HGB BLD-MCNC: 13.7 G/DL (ref 12–16)
IMM GRANULOCYTES # BLD AUTO: 0.02 X10(3) UL (ref 0–1)
IMM GRANULOCYTES NFR BLD: 0.2 %
LYMPHOCYTES # BLD AUTO: 1.79 X10(3) UL (ref 1–4)
LYMPHOCYTES NFR BLD AUTO: 21.9 %
MCH RBC QN AUTO: 33.3 PG (ref 26–34)
MCHC RBC AUTO-ENTMCNC: 34.6 G/DL (ref 31–37)
MCV RBC AUTO: 96.4 FL (ref 80–100)
MONOCYTES # BLD AUTO: 0.32 X10(3) UL (ref 0.1–1)
MONOCYTES NFR BLD AUTO: 3.9 %
NEUTROPHILS # BLD AUTO: 5.98 X10 (3) UL (ref 1.5–7.7)
NEUTROPHILS # BLD AUTO: 5.98 X10(3) UL (ref 1.5–7.7)
NEUTROPHILS NFR BLD AUTO: 73.2 %
PLATELET # BLD AUTO: 231 10(3)UL (ref 150–450)
RBC # BLD AUTO: 4.11 X10(6)UL (ref 3.8–5.3)
RH BLOOD TYPE: POSITIVE
RUBV IGG SER QL: POSITIVE
RUBV IGG SER-ACNC: 245.9 IU/ML (ref 10–?)
WBC # BLD AUTO: 8.2 X10(3) UL (ref 4–11)

## 2020-08-01 PROCEDURE — 82950 GLUCOSE TEST: CPT

## 2020-08-01 PROCEDURE — 87389 HIV-1 AG W/HIV-1&-2 AB AG IA: CPT

## 2020-08-01 PROCEDURE — 86780 TREPONEMA PALLIDUM: CPT

## 2020-08-01 PROCEDURE — 86850 RBC ANTIBODY SCREEN: CPT

## 2020-08-01 PROCEDURE — 36415 COLL VENOUS BLD VENIPUNCTURE: CPT

## 2020-08-01 PROCEDURE — 85025 COMPLETE CBC W/AUTO DIFF WBC: CPT

## 2020-08-01 PROCEDURE — 84702 CHORIONIC GONADOTROPIN TEST: CPT

## 2020-08-01 PROCEDURE — 86762 RUBELLA ANTIBODY: CPT

## 2020-08-01 PROCEDURE — 86901 BLOOD TYPING SEROLOGIC RH(D): CPT

## 2020-08-01 PROCEDURE — 87340 HEPATITIS B SURFACE AG IA: CPT

## 2020-08-01 PROCEDURE — 87086 URINE CULTURE/COLONY COUNT: CPT

## 2020-08-01 PROCEDURE — 86900 BLOOD TYPING SEROLOGIC ABO: CPT

## 2020-08-02 ENCOUNTER — TELEPHONE (OUTPATIENT)
Dept: OBGYN CLINIC | Facility: CLINIC | Age: 30
End: 2020-08-02

## 2020-08-02 DIAGNOSIS — O20.0 THREATENED ABORTION, ANTEPARTUM: Primary | ICD-10-CM

## 2020-08-02 NOTE — TELEPHONE ENCOUNTER
Patient informed that Arnol Covarrubias does not seem to be rising properly. Please send patient for Hold and Call U/S on Monday, 8/3/2020. Diagnosis is Threatened AB.

## 2020-08-03 ENCOUNTER — TELEPHONE (OUTPATIENT)
Dept: OBGYN CLINIC | Facility: CLINIC | Age: 30
End: 2020-08-03

## 2020-08-03 ENCOUNTER — HOSPITAL ENCOUNTER (OUTPATIENT)
Dept: ULTRASOUND IMAGING | Facility: HOSPITAL | Age: 30
Discharge: HOME | End: 2020-08-03
Attending: OBSTETRICS & GYNECOLOGY
Payer: MEDICAID

## 2020-08-03 DIAGNOSIS — O20.0 THREATENED ABORTION, ANTEPARTUM: ICD-10-CM

## 2020-08-03 LAB — T PALLIDUM AB SER QL: NEGATIVE

## 2020-08-03 PROCEDURE — 76815 OB US LIMITED FETUS(S): CPT | Performed by: OBSTETRICS & GYNECOLOGY

## 2020-08-03 NOTE — TELEPHONE ENCOUNTER
Primary Diagnosis Code: O20.0 Description: Threatened   Secondary Diagnosis Code:  Description:   Date of Service: Not provided    CPT Code: 18891 OBUS Description: OB Ultrasound  Case Number: [id number]  Review Date: 8/3/2020 12:34:55 PM  Maryana Walls

## 2020-08-03 NOTE — TELEPHONE ENCOUNTER
Spoke with US dept and they have scheduled pt for H&C at 11:30. Advised to have pt come in after drinking 32 oz water. Placed call to pt and verified name and . Informed pt of scheduled H&C at [facility].  Provided info of where to park and

## 2020-08-03 NOTE — TELEPHONE ENCOUNTER
Pt was appraised of normal fhts on limited u/s today for viabililty. Pt stated she has a regular pn appt with provider tomorrow.

## 2020-08-04 ENCOUNTER — INITIAL PRENATAL (OUTPATIENT)
Dept: OBGYN CLINIC | Facility: CLINIC | Age: 30
End: 2020-08-04
Payer: MEDICAID

## 2020-08-04 VITALS — DIASTOLIC BLOOD PRESSURE: 70 MMHG | WEIGHT: 189 LBS | SYSTOLIC BLOOD PRESSURE: 118 MMHG | BODY MASS INDEX: 32 KG/M2

## 2020-08-04 DIAGNOSIS — Z34.92 NORMAL PREGNANCY IN SECOND TRIMESTER: Primary | ICD-10-CM

## 2020-08-04 DIAGNOSIS — E66.01 MORBID OBESITY WITH BODY MASS INDEX (BMI) OF 40.0 OR HIGHER (HCC): ICD-10-CM

## 2020-08-04 LAB
APPEARANCE: CLEAR
MULTISTIX LOT#: NORMAL NUMERIC
PH, URINE: 6 (ref 4.5–8)
SPECIFIC GRAVITY: 1.02 (ref 1–1.03)
URINE-COLOR: YELLOW
UROBILINOGEN,SEMI-QN: 0.2 MG/DL (ref 0–1.9)

## 2020-08-04 PROCEDURE — 0500F INITIAL PRENATAL CARE VISIT: CPT | Performed by: OBSTETRICS & GYNECOLOGY

## 2020-08-04 PROCEDURE — 81002 URINALYSIS NONAUTO W/O SCOPE: CPT | Performed by: OBSTETRICS & GYNECOLOGY

## 2020-08-04 PROCEDURE — 3078F DIAST BP <80 MM HG: CPT | Performed by: OBSTETRICS & GYNECOLOGY

## 2020-08-04 PROCEDURE — 1111F DSCHRG MED/CURRENT MED MERGE: CPT | Performed by: OBSTETRICS & GYNECOLOGY

## 2020-08-04 PROCEDURE — 3074F SYST BP LT 130 MM HG: CPT | Performed by: OBSTETRICS & GYNECOLOGY

## 2020-08-04 NOTE — PROGRESS NOTES
S/P Appendectomy. No C/Os. Desires to return to work but with lifting restriction. Pap Done. GC/Chlamydia Culture Done. Declines all Genetic screening. To schedule Level 2 U/S x 3 weeks due to High BMI.

## 2020-08-05 LAB
C TRACH DNA SPEC QL NAA+PROBE: NEGATIVE
HPV I/H RISK 1 DNA SPEC QL NAA+PROBE: NEGATIVE
N GONORRHOEA DNA SPEC QL NAA+PROBE: NEGATIVE

## 2020-08-06 LAB — LAST PAP RESULT: NORMAL

## 2020-08-06 NOTE — TELEPHONE ENCOUNTER
Authorization Number: [id number]  Case Number: [id number]     Status: Approved  P2P Status:   Approval Date: 8/5/2020 12:00:00 AM  Service Code: OBUS  Service Description: OB Ultrasound  Site Name: [address]  Expiration Date: 5/2/2021  D

## 2020-08-17 ENCOUNTER — TELEPHONE (OUTPATIENT)
Dept: PERINATAL CARE | Facility: HOSPITAL | Age: 30
End: 2020-08-17

## 2020-08-17 NOTE — TELEPHONE ENCOUNTER
[facility] Diagnosis Code: E66.01 Description: Morbid (severe) obesity  Secondary Diagnosis Code:    Date of Service: 8/27/20   CPT Code: 35273   Case Number: [id number]  Review Date: 8/17/2020 3:56:32 PM  Provider: TRINIDAD  Status: Your case has been sent to [facility] FOR CANCER AND ALLIED DISEASES

## 2020-08-19 ENCOUNTER — MED REC SCAN ONLY (OUTPATIENT)
Dept: OBGYN CLINIC | Facility: CLINIC | Age: 30
End: 2020-08-19

## 2020-08-19 NOTE — TELEPHONE ENCOUNTER
Authorization Number: [id number]  Case Number: [id number]     Status: Approved  P2P Status:   Approval Date: 8/18/2020 12:00:00 AM  Service Code: OBUS  Service Description: OB Ultrasound  Site Name: [address] -- [address]

## 2020-08-20 NOTE — PROGRESS NOTES
Outpatient Maternal-Fetal Medicine Consultation    Dear Dr. Samira Perrin,    Thank you for requesting ultrasound evaluation and maternal fetal medicine consultation on your patient Rowan Campos.   As you are aware she is a 34year old female with a [place] pre includes appendectomy (2020). Family History  The patient She indicated that her mother is alive. She indicated that her father is alive. She indicated that her maternal grandmother is .  She indicated that her maternal grandfather is Memo Dunaway mellitus. Due to its strong association with obesity in the general population, type 2 diabetes mellitus is one of the two most common medical complications of the obese .  The increased risk of type 2 diabetes is primarily related to an ex cardiac), and the risk may increase with increasing maternal weight. Level II ultrasound is advised for women with obesity. The risk of neural tube defects increased significantly with maternal weight.     The analysis found that overweight and obese preg pepito    Thank you for allowing me to participate in the care of your patient. Please do not hesitate to contact me if additional questions or concerns arise. Karyle Comings, M.D.     The majority of the time (>50%) was spent in review of records, Thee Slade

## 2020-08-27 ENCOUNTER — HOSPITAL ENCOUNTER (OUTPATIENT)
Dept: PERINATAL CARE | Facility: HOSPITAL | Age: 30
Discharge: HOME | End: 2020-08-27
Attending: OBSTETRICS & GYNECOLOGY
Payer: MEDICAID

## 2020-08-27 ENCOUNTER — TELEPHONE (OUTPATIENT)
Dept: PERINATAL CARE | Facility: HOSPITAL | Age: 30
End: 2020-08-27

## 2020-08-27 VITALS
WEIGHT: 189 LBS | HEIGHT: 64 IN | BODY MASS INDEX: 32.27 KG/M2 | HEART RATE: 88 BPM | DIASTOLIC BLOOD PRESSURE: 74 MMHG | SYSTOLIC BLOOD PRESSURE: 115 MMHG

## 2020-08-27 DIAGNOSIS — O99.212 OBESITY AFFECTING PREGNANCY IN SECOND TRIMESTER: Primary | ICD-10-CM

## 2020-08-27 DIAGNOSIS — O99.212 OBESITY AFFECTING PREGNANCY IN SECOND TRIMESTER: ICD-10-CM

## 2020-08-27 PROCEDURE — 76811 OB US DETAILED SNGL FETUS: CPT | Performed by: OBSTETRICS & GYNECOLOGY

## 2020-08-27 PROCEDURE — 99243 OFF/OP CNSLTJ NEW/EST LOW 30: CPT | Performed by: OBSTETRICS & GYNECOLOGY

## 2020-09-04 ENCOUNTER — ROUTINE PRENATAL (OUTPATIENT)
Dept: OBGYN CLINIC | Facility: CLINIC | Age: 30
End: 2020-09-04
Payer: MEDICAID

## 2020-09-04 VITALS
BODY MASS INDEX: 34 KG/M2 | DIASTOLIC BLOOD PRESSURE: 69 MMHG | HEART RATE: 83 BPM | SYSTOLIC BLOOD PRESSURE: 107 MMHG | WEIGHT: 197 LBS

## 2020-09-04 DIAGNOSIS — Z34.82 ENCOUNTER FOR SUPERVISION OF OTHER NORMAL PREGNANCY IN SECOND TRIMESTER: Primary | ICD-10-CM

## 2020-09-04 PROBLEM — O99.210 OBESITY AFFECTING PREGNANCY, ANTEPARTUM: Status: ACTIVE | Noted: 2020-09-04

## 2020-09-04 PROBLEM — Z34.90 SUPERVISION OF NORMAL PREGNANCY: Status: ACTIVE | Noted: 2020-09-04

## 2020-09-04 PROBLEM — O99.210 OBESITY AFFECTING PREGNANCY, ANTEPARTUM (HCC): Status: ACTIVE | Noted: 2020-09-04

## 2020-09-04 PROBLEM — Z34.90 SUPERVISION OF NORMAL PREGNANCY (HCC): Status: ACTIVE | Noted: 2020-09-04

## 2020-09-04 LAB
MULTISTIX LOT#: 1044 NUMERIC
PH, URINE: 7 (ref 4.5–8)
SPECIFIC GRAVITY: 1.01 (ref 1–1.03)
URINE-COLOR: YELLOW
UROBILINOGEN,SEMI-QN: 0.2 MG/DL (ref 0–1.9)

## 2020-09-04 PROCEDURE — 3074F SYST BP LT 130 MM HG: CPT | Performed by: OBSTETRICS & GYNECOLOGY

## 2020-09-04 PROCEDURE — 0502F SUBSEQUENT PRENATAL CARE: CPT | Performed by: OBSTETRICS & GYNECOLOGY

## 2020-09-04 PROCEDURE — 81002 URINALYSIS NONAUTO W/O SCOPE: CPT | Performed by: OBSTETRICS & GYNECOLOGY

## 2020-09-04 PROCEDURE — 3078F DIAST BP <80 MM HG: CPT | Performed by: OBSTETRICS & GYNECOLOGY

## 2020-09-04 NOTE — PROGRESS NOTES
Had laparoscopic appendectomy July 20 for acute appendicitis with Dr. Maryann Layton. Fetal movements. Right round ligament pains briefly. Had normal level 2 ultrasound. History of 2 large for gestational age babies.   Will need repeat 1 hour Glucola at 28 we

## 2020-09-17 ENCOUNTER — TELEPHONE (OUTPATIENT)
Dept: OBGYN CLINIC | Facility: CLINIC | Age: 30
End: 2020-09-17

## 2020-09-17 NOTE — TELEPHONE ENCOUNTER
Needs note to be seen at dentist  , it a check up   Pt  Will  note at office call when ready Nepali speaking ,

## 2020-09-17 NOTE — TELEPHONE ENCOUNTER
Pt Name and  verified. Pt informed that letter is generated an in chart under Communications/Letters. Pt will  at [address] [place] [address].

## 2020-10-06 ENCOUNTER — ROUTINE PRENATAL (OUTPATIENT)
Dept: OBGYN CLINIC | Facility: CLINIC | Age: 30
End: 2020-10-06
Payer: MEDICAID

## 2020-10-06 VITALS
DIASTOLIC BLOOD PRESSURE: 81 MMHG | WEIGHT: 204 LBS | BODY MASS INDEX: 35 KG/M2 | SYSTOLIC BLOOD PRESSURE: 120 MMHG | HEART RATE: 109 BPM

## 2020-10-06 DIAGNOSIS — Z34.82 ENCOUNTER FOR SUPERVISION OF OTHER NORMAL PREGNANCY IN SECOND TRIMESTER: Primary | ICD-10-CM

## 2020-10-06 PROCEDURE — 3079F DIAST BP 80-89 MM HG: CPT | Performed by: OBSTETRICS & GYNECOLOGY

## 2020-10-06 PROCEDURE — 3074F SYST BP LT 130 MM HG: CPT | Performed by: OBSTETRICS & GYNECOLOGY

## 2020-10-06 PROCEDURE — 0502F SUBSEQUENT PRENATAL CARE: CPT | Performed by: OBSTETRICS & GYNECOLOGY

## 2020-10-06 PROCEDURE — 81002 URINALYSIS NONAUTO W/O SCOPE: CPT | Performed by: OBSTETRICS & GYNECOLOGY

## 2020-10-06 NOTE — PROGRESS NOTES
[facility]  Obstetrics and Gynecology  Prenatal Visit  Sharan Sharma MD    [facility]   Luciano Merrill is a 34year old.o.  25w5d weeks. Here for routine prenatal visit and is without complaints.   Patient denies any regular uterine contractions, spont caregivers. Flu vaccine offered and patient elects to wait till a follow-up visit. Pt counseled on recommendations of tdap in pregnancy. Discussed risks of pertussis/\"whooping cough\", in newborns.   Discussed benefits of preventing pertussis in those a

## 2020-10-24 ENCOUNTER — LAB ENCOUNTER (OUTPATIENT)
Dept: LAB | Age: 30
End: 2020-10-24
Attending: OBSTETRICS & GYNECOLOGY
Payer: MEDICAID

## 2020-10-24 DIAGNOSIS — Z34.82 ENCOUNTER FOR SUPERVISION OF OTHER NORMAL PREGNANCY IN SECOND TRIMESTER: ICD-10-CM

## 2020-10-24 PROCEDURE — 85025 COMPLETE CBC W/AUTO DIFF WBC: CPT

## 2020-10-24 PROCEDURE — 82950 GLUCOSE TEST: CPT

## 2020-10-24 PROCEDURE — 36415 COLL VENOUS BLD VENIPUNCTURE: CPT

## 2020-10-26 ENCOUNTER — ROUTINE PRENATAL (OUTPATIENT)
Dept: OBGYN CLINIC | Facility: CLINIC | Age: 30
End: 2020-10-26
Payer: MEDICAID

## 2020-10-26 VITALS — WEIGHT: 211 LBS | BODY MASS INDEX: 36 KG/M2

## 2020-10-26 DIAGNOSIS — O99.212 OBESITY AFFECTING PREGNANCY IN SECOND TRIMESTER: ICD-10-CM

## 2020-10-26 DIAGNOSIS — Z23 NEED FOR VACCINATION: ICD-10-CM

## 2020-10-26 DIAGNOSIS — Z34.92 NORMAL PREGNANCY IN SECOND TRIMESTER: Primary | ICD-10-CM

## 2020-10-26 PROCEDURE — 81002 URINALYSIS NONAUTO W/O SCOPE: CPT | Performed by: ADVANCED PRACTICE MIDWIFE

## 2020-10-26 PROCEDURE — 0502F SUBSEQUENT PRENATAL CARE: CPT | Performed by: ADVANCED PRACTICE MIDWIFE

## 2020-10-26 PROCEDURE — 90715 TDAP VACCINE 7 YRS/> IM: CPT | Performed by: ADVANCED PRACTICE MIDWIFE

## 2020-10-26 PROCEDURE — 90471 IMMUNIZATION ADMIN: CPT | Performed by: ADVANCED PRACTICE MIDWIFE

## 2020-10-26 PROCEDURE — 90472 IMMUNIZATION ADMIN EACH ADD: CPT | Performed by: ADVANCED PRACTICE MIDWIFE

## 2020-10-26 PROCEDURE — 90686 IIV4 VACC NO PRSV 0.5 ML IM: CPT | Performed by: ADVANCED PRACTICE MIDWIFE

## 2020-10-26 RX ORDER — BREAST PUMP
EACH MISCELLANEOUS
Qty: 1 EACH | Refills: 0 | Status: SHIPPED | OUTPATIENT
Start: 2020-10-26

## 2020-10-26 NOTE — PROGRESS NOTES
[facility]  Obstetrics and Gynecology  Prenatal Visit  Milind Rodriguez CNM, APRN    HPI   Rowan Campos is a 34year old.o.  28w4d weeks.   Baby active, its a girl  Denies HA, vision change, URQ pain, swelling, CTX, or LOF  Denies fever, chills, feeding and benefits of breast feeding discussed.   Pt elects breast, Pump ordered  NB hospital care discussed to include, Vitamin K and HBSAG administration, NB screening, Bilirubin, heart assessment, hearing assessment and peds f/u  Contraceptive options

## 2020-11-02 NOTE — TELEPHONE ENCOUNTER
Primary Diagnosis Code: O99.212 Description: Obesity complicating pregnancy, second trimester  Secondary Diagnosis Code:  Description:   Date of Service: Not provided    CPT Code: 50890 Description: Ob us, follow-up, per fetus  Case Number: [id number]  Rev

## 2020-11-06 NOTE — TELEPHONE ENCOUNTER
Authorization Number: NA  Case Number: [id number]     Status: Denied  P2P Status:      Approval Date:   Service Code: 08408  Service Description: Ob us, follow-up, per fetus  Site Name: [facility]

## 2020-11-10 ENCOUNTER — ROUTINE PRENATAL (OUTPATIENT)
Dept: OBGYN CLINIC | Facility: CLINIC | Age: 30
End: 2020-11-10
Payer: MEDICAID

## 2020-11-10 ENCOUNTER — TELEPHONE (OUTPATIENT)
Dept: PERINATAL CARE | Facility: HOSPITAL | Age: 30
End: 2020-11-10

## 2020-11-10 VITALS — WEIGHT: 209 LBS | BODY MASS INDEX: 36 KG/M2 | SYSTOLIC BLOOD PRESSURE: 104 MMHG | DIASTOLIC BLOOD PRESSURE: 60 MMHG

## 2020-11-10 DIAGNOSIS — Z34.93 NORMAL PREGNANCY IN THIRD TRIMESTER: Primary | ICD-10-CM

## 2020-11-10 PROCEDURE — 3078F DIAST BP <80 MM HG: CPT | Performed by: OBSTETRICS & GYNECOLOGY

## 2020-11-10 PROCEDURE — 3074F SYST BP LT 130 MM HG: CPT | Performed by: OBSTETRICS & GYNECOLOGY

## 2020-11-10 PROCEDURE — 0502F SUBSEQUENT PRENATAL CARE: CPT | Performed by: OBSTETRICS & GYNECOLOGY

## 2020-11-10 PROCEDURE — 81002 URINALYSIS NONAUTO W/O SCOPE: CPT | Performed by: OBSTETRICS & GYNECOLOGY

## 2020-11-10 NOTE — TELEPHONE ENCOUNTER
Looks like patients authorization for [employer] U/S scheduled on 11/16/2020 has been denied after appeal.  Please notify patient and let us know status of her appointment.     Thanks

## 2020-11-10 NOTE — TELEPHONE ENCOUNTER
Appeal sent over to [address]. Pt called and informed that US is pending review.  Routing to [facility] as [employer]

## 2020-11-16 ENCOUNTER — TELEPHONE (OUTPATIENT)
Dept: PERINATAL CARE | Facility: HOSPITAL | Age: 30
End: 2020-11-16

## 2020-11-16 NOTE — TELEPHONE ENCOUNTER
M AUTHORIZATION  Received:  Today  Message Contents   Maximino Sotomayor Em Ob/Gyne Wmob Prior Ezekiel Zuniga             Patient is rescheduled to 11/20/2020

## 2020-11-16 NOTE — TELEPHONE ENCOUNTER
Per Paresh Hernandez patients appeal still pending.   Informed her I will call to reschedule patient

## 2020-11-18 NOTE — TELEPHONE ENCOUNTER
Authorization Number: NA  Case Number: [id number]     Status: Appeal Request Received  P2P Status:   Approval Date:   Service Code: 79535  Service Description: Ob us, follow-up, per fetus  Site Name: [address]  Expiration Date:   Date Last

## 2020-11-19 ENCOUNTER — TELEPHONE (OUTPATIENT)
Dept: PERINATAL CARE | Facility: HOSPITAL | Age: 30
End: 2020-11-19

## 2020-11-20 ENCOUNTER — TELEPHONE (OUTPATIENT)
Dept: PERINATAL CARE | Facility: HOSPITAL | Age: 30
End: 2020-11-20

## 2020-11-20 NOTE — TELEPHONE ENCOUNTER
Pt Name and  verified. Spoke with pt and informed that US is still pending review. Pt voiced understanding and aware that once approved, she will be called to get it scheduled. No further questions.

## 2020-11-20 NOTE — TELEPHONE ENCOUNTER
PATIENT APPOINTMENT POSTPONED DUE TO INSURANCE PENDING, OB OFFICE NOTIFIED. PATIENT TO RESCHEDULE WHEN AUTHORIZATION APPROVED.

## 2020-11-20 NOTE — TELEPHONE ENCOUNTER
Received call from [address] at [facility] to inquire about pt's case:     Per [place], appeal still pending review.

## 2020-11-23 ENCOUNTER — ROUTINE PRENATAL (OUTPATIENT)
Dept: OBGYN CLINIC | Facility: CLINIC | Age: 30
End: 2020-11-23
Payer: MEDICAID

## 2020-11-23 VITALS
HEART RATE: 98 BPM | DIASTOLIC BLOOD PRESSURE: 75 MMHG | WEIGHT: 210 LBS | SYSTOLIC BLOOD PRESSURE: 112 MMHG | BODY MASS INDEX: 36 KG/M2

## 2020-11-23 DIAGNOSIS — Z34.83 ENCOUNTER FOR SUPERVISION OF OTHER NORMAL PREGNANCY IN THIRD TRIMESTER: Primary | ICD-10-CM

## 2020-11-23 PROCEDURE — 3078F DIAST BP <80 MM HG: CPT | Performed by: OBSTETRICS & GYNECOLOGY

## 2020-11-23 PROCEDURE — 81002 URINALYSIS NONAUTO W/O SCOPE: CPT | Performed by: OBSTETRICS & GYNECOLOGY

## 2020-11-23 PROCEDURE — 0502F SUBSEQUENT PRENATAL CARE: CPT | Performed by: OBSTETRICS & GYNECOLOGY

## 2020-11-23 PROCEDURE — 3074F SYST BP LT 130 MM HG: CPT | Performed by: OBSTETRICS & GYNECOLOGY

## 2020-11-30 ENCOUNTER — HOSPITAL ENCOUNTER (OUTPATIENT)
Dept: PERINATAL CARE | Facility: HOSPITAL | Age: 30
Discharge: HOME | End: 2020-11-30
Attending: OBSTETRICS & GYNECOLOGY
Payer: MEDICAID

## 2020-11-30 VITALS
DIASTOLIC BLOOD PRESSURE: 70 MMHG | SYSTOLIC BLOOD PRESSURE: 116 MMHG | HEART RATE: 99 BPM | BODY MASS INDEX: 37 KG/M2 | WEIGHT: 215 LBS

## 2020-11-30 DIAGNOSIS — O99.210 OBESITY AFFECTING PREGNANCY: Primary | ICD-10-CM

## 2020-11-30 DIAGNOSIS — O99.210 OBESITY AFFECTING PREGNANCY: ICD-10-CM

## 2020-11-30 DIAGNOSIS — O99.213 OBESITY AFFECTING PREGNANCY IN THIRD TRIMESTER: ICD-10-CM

## 2020-11-30 PROCEDURE — 76816 OB US FOLLOW-UP PER FETUS: CPT | Performed by: OBSTETRICS & GYNECOLOGY

## 2020-11-30 PROCEDURE — 99213 OFFICE O/P EST LOW 20 MIN: CPT | Performed by: OBSTETRICS & GYNECOLOGY

## 2020-11-30 PROCEDURE — 76819 FETAL BIOPHYS PROFIL W/O NST: CPT | Performed by: OBSTETRICS & GYNECOLOGY

## 2020-11-30 NOTE — PROGRESS NOTES
Caleb Aguirre    Dear Dr. Rosa M Philip    Thank you for requesting ultrasound evaluation and maternal fetal medicine consultation on your patient Ricardo Rogel.   As you are aware she is a 27year old female  with a maharaj are seen today. The patient understands that ultrasound cannot rule out all structural and chromosomal abnormalities. See PACS/Imaging Tab For Complete Ultrasound Report  I interpreted the results and reviewed them with the patient.     DISCUSSION  During

## 2020-12-12 ENCOUNTER — ROUTINE PRENATAL (OUTPATIENT)
Dept: OBGYN CLINIC | Facility: CLINIC | Age: 30
End: 2020-12-12
Payer: MEDICAID

## 2020-12-12 VITALS — WEIGHT: 216 LBS | DIASTOLIC BLOOD PRESSURE: 66 MMHG | SYSTOLIC BLOOD PRESSURE: 110 MMHG | BODY MASS INDEX: 37 KG/M2

## 2020-12-12 DIAGNOSIS — Z34.83 ENCOUNTER FOR SUPERVISION OF OTHER NORMAL PREGNANCY IN THIRD TRIMESTER: Primary | ICD-10-CM

## 2020-12-12 PROCEDURE — 3078F DIAST BP <80 MM HG: CPT | Performed by: OBSTETRICS & GYNECOLOGY

## 2020-12-12 PROCEDURE — 3074F SYST BP LT 130 MM HG: CPT | Performed by: OBSTETRICS & GYNECOLOGY

## 2020-12-12 PROCEDURE — 0502F SUBSEQUENT PRENATAL CARE: CPT | Performed by: OBSTETRICS & GYNECOLOGY

## 2020-12-12 PROCEDURE — 81002 URINALYSIS NONAUTO W/O SCOPE: CPT | Performed by: OBSTETRICS & GYNECOLOGY

## 2020-12-12 NOTE — PROGRESS NOTES
[facility], [facility]  Obstetrics and Gynecology  Prenatal Visit  Lisette Vital MD    ARYAN Cordova is a 27year old.o.  35w2d weeks. Here for routine prenatal visit and is without complaints.   Patient denies any regular uterine contractions, spont

## 2020-12-14 ENCOUNTER — LAB ENCOUNTER (OUTPATIENT)
Dept: LAB | Age: 30
End: 2020-12-14
Attending: OBSTETRICS & GYNECOLOGY
Payer: MEDICAID

## 2020-12-14 DIAGNOSIS — Z34.83 ENCOUNTER FOR SUPERVISION OF OTHER NORMAL PREGNANCY IN THIRD TRIMESTER: ICD-10-CM

## 2020-12-14 PROCEDURE — 86780 TREPONEMA PALLIDUM: CPT

## 2020-12-14 PROCEDURE — 87389 HIV-1 AG W/HIV-1&-2 AB AG IA: CPT

## 2020-12-14 PROCEDURE — 85025 COMPLETE CBC W/AUTO DIFF WBC: CPT

## 2020-12-14 PROCEDURE — 36415 COLL VENOUS BLD VENIPUNCTURE: CPT

## 2020-12-19 ENCOUNTER — ROUTINE PRENATAL (OUTPATIENT)
Dept: OBGYN CLINIC | Facility: CLINIC | Age: 30
End: 2020-12-19
Payer: MEDICAID

## 2020-12-19 VITALS — DIASTOLIC BLOOD PRESSURE: 70 MMHG | WEIGHT: 216 LBS | BODY MASS INDEX: 37 KG/M2 | SYSTOLIC BLOOD PRESSURE: 118 MMHG

## 2020-12-19 DIAGNOSIS — Z34.83 ENCOUNTER FOR SUPERVISION OF OTHER NORMAL PREGNANCY IN THIRD TRIMESTER: Primary | ICD-10-CM

## 2020-12-19 PROCEDURE — 3074F SYST BP LT 130 MM HG: CPT | Performed by: OBSTETRICS & GYNECOLOGY

## 2020-12-19 PROCEDURE — 0502F SUBSEQUENT PRENATAL CARE: CPT | Performed by: OBSTETRICS & GYNECOLOGY

## 2020-12-19 PROCEDURE — 81002 URINALYSIS NONAUTO W/O SCOPE: CPT | Performed by: OBSTETRICS & GYNECOLOGY

## 2020-12-19 PROCEDURE — 3078F DIAST BP <80 MM HG: CPT | Performed by: OBSTETRICS & GYNECOLOGY

## 2020-12-19 NOTE — PROGRESS NOTES
No complaints. Good fetal movements. Yesterday had a spell of 2 hours of some contractions as close as 5 minutes apart which went away. Denies vaginal bleeding or leaking fluid.   Group B strep vaginal culture done labor and rupture membrane precautions

## 2020-12-21 ENCOUNTER — HOSPITAL ENCOUNTER (OUTPATIENT)
Dept: PERINATAL CARE | Facility: HOSPITAL | Age: 30
Discharge: HOME | End: 2020-12-21
Attending: OBSTETRICS & GYNECOLOGY
Payer: MEDICAID

## 2020-12-21 DIAGNOSIS — O99.210 OBESITY AFFECTING PREGNANCY, ANTEPARTUM: ICD-10-CM

## 2020-12-21 DIAGNOSIS — E66.9 OBESITY: Primary | ICD-10-CM

## 2020-12-21 PROCEDURE — 59025 FETAL NON-STRESS TEST: CPT | Performed by: OBSTETRICS & GYNECOLOGY

## 2020-12-21 NOTE — NST
Nonstress Test   Patient: Oral Ken    Gestation: 36w4d    NST: obesity       Variability: Moderate           Accelerations: Yes           Decelerations: None            Baseline: 130 BPM           Uterine Irritability: No           Contractions: Irregul

## 2020-12-21 NOTE — ADDENDUM NOTE
Encounter addended by: Cori Jenkins MD on: 12/21/2020 5:54 PM   Actions taken: Clinical Note Signed, Visit diagnoses modified, Charge Capture section accepted

## 2020-12-23 ENCOUNTER — HOSPITAL ENCOUNTER (INPATIENT)
Facility: HOSPITAL | Age: 30
LOS: 1 days | Discharge: HOME | End: 2020-12-24
Attending: OBSTETRICS & GYNECOLOGY | Admitting: OBSTETRICS & GYNECOLOGY
Payer: MEDICAID

## 2020-12-23 PROBLEM — Z34.90 PREGNANCY: Status: ACTIVE | Noted: 2020-12-23

## 2020-12-23 PROBLEM — Z34.90 PREGNANCY (HCC): Status: ACTIVE | Noted: 2020-12-23

## 2020-12-23 PROCEDURE — 59409 OBSTETRICAL CARE: CPT | Performed by: OBSTETRICS & GYNECOLOGY

## 2020-12-23 RX ORDER — TRISODIUM CITRATE DIHYDRATE AND CITRIC ACID MONOHYDRATE 500; 334 MG/5ML; MG/5ML
30 SOLUTION ORAL AS NEEDED
Status: DISCONTINUED | OUTPATIENT
Start: 2020-12-23 | End: 2020-12-23 | Stop reason: HOSPADM

## 2020-12-23 RX ORDER — ACETAMINOPHEN 500 MG
500 TABLET ORAL EVERY 6 HOURS PRN
Status: DISCONTINUED | OUTPATIENT
Start: 2020-12-23 | End: 2020-12-23 | Stop reason: HOSPADM

## 2020-12-23 RX ORDER — TERBUTALINE SULFATE 1 MG/ML
0.25 INJECTION, SOLUTION SUBCUTANEOUS AS NEEDED
Status: DISCONTINUED | OUTPATIENT
Start: 2020-12-23 | End: 2020-12-23 | Stop reason: HOSPADM

## 2020-12-23 RX ORDER — MISOPROSTOL 200 UG/1
TABLET ORAL
Status: COMPLETED
Start: 2020-12-23 | End: 2020-12-23

## 2020-12-23 RX ORDER — BISACODYL 10 MG
10 SUPPOSITORY, RECTAL RECTAL ONCE AS NEEDED
Status: DISCONTINUED | OUTPATIENT
Start: 2020-12-23 | End: 2020-12-24

## 2020-12-23 RX ORDER — SIMETHICONE 80 MG
80 TABLET,CHEWABLE ORAL 3 TIMES DAILY PRN
Status: DISCONTINUED | OUTPATIENT
Start: 2020-12-23 | End: 2020-12-24

## 2020-12-23 RX ORDER — ACETAMINOPHEN 325 MG/1
650 TABLET ORAL EVERY 6 HOURS PRN
Status: DISCONTINUED | OUTPATIENT
Start: 2020-12-23 | End: 2020-12-24

## 2020-12-23 RX ORDER — DOCUSATE SODIUM 100 MG/1
100 CAPSULE, LIQUID FILLED ORAL
Status: DISCONTINUED | OUTPATIENT
Start: 2020-12-23 | End: 2020-12-24

## 2020-12-23 RX ORDER — DIAPER,BRIEF,INFANT-TODD,DISP
1 EACH MISCELLANEOUS EVERY 6 HOURS PRN
Status: DISCONTINUED | OUTPATIENT
Start: 2020-12-23 | End: 2020-12-24

## 2020-12-23 RX ORDER — LIDOCAINE HYDROCHLORIDE 10 MG/ML
INJECTION, SOLUTION EPIDURAL; INFILTRATION; INTRACAUDAL; PERINEURAL
Status: DISCONTINUED
Start: 2020-12-23 | End: 2020-12-23 | Stop reason: WASHOUT

## 2020-12-23 RX ORDER — IBUPROFEN 600 MG/1
600 TABLET ORAL EVERY 6 HOURS
Status: DISCONTINUED | OUTPATIENT
Start: 2020-12-23 | End: 2020-12-24

## 2020-12-23 RX ORDER — AMMONIA INHALANTS 0.04 G/.3ML
0.3 INHALANT RESPIRATORY (INHALATION) AS NEEDED
Status: DISCONTINUED | OUTPATIENT
Start: 2020-12-23 | End: 2020-12-23 | Stop reason: HOSPADM

## 2020-12-23 RX ORDER — IBUPROFEN 600 MG/1
600 TABLET ORAL EVERY 6 HOURS PRN
Status: DISCONTINUED | OUTPATIENT
Start: 2020-12-23 | End: 2020-12-23 | Stop reason: HOSPADM

## 2020-12-23 RX ORDER — ONDANSETRON 2 MG/ML
4 INJECTION INTRAMUSCULAR; INTRAVENOUS EVERY 6 HOURS PRN
Status: DISCONTINUED | OUTPATIENT
Start: 2020-12-23 | End: 2020-12-24

## 2020-12-23 RX ORDER — DEXTROSE, SODIUM CHLORIDE, SODIUM LACTATE, POTASSIUM CHLORIDE, AND CALCIUM CHLORIDE 5; .6; .31; .03; .02 G/100ML; G/100ML; G/100ML; G/100ML; G/100ML
INJECTION, SOLUTION INTRAVENOUS AS NEEDED
Status: DISCONTINUED | OUTPATIENT
Start: 2020-12-23 | End: 2020-12-23 | Stop reason: HOSPADM

## 2020-12-23 RX ORDER — AMMONIA INHALANTS 0.04 G/.3ML
0.3 INHALANT RESPIRATORY (INHALATION) AS NEEDED
Status: DISCONTINUED | OUTPATIENT
Start: 2020-12-23 | End: 2020-12-24

## 2020-12-23 RX ORDER — ONDANSETRON 2 MG/ML
4 INJECTION INTRAMUSCULAR; INTRAVENOUS EVERY 6 HOURS PRN
Status: DISCONTINUED | OUTPATIENT
Start: 2020-12-23 | End: 2020-12-23 | Stop reason: HOSPADM

## 2020-12-23 RX ORDER — MISOPROSTOL 200 UG/1
1000 TABLET ORAL ONCE
Status: COMPLETED | OUTPATIENT
Start: 2020-12-23 | End: 2020-12-23

## 2020-12-23 RX ORDER — SODIUM CHLORIDE, SODIUM LACTATE, POTASSIUM CHLORIDE, CALCIUM CHLORIDE 600; 310; 30; 20 MG/100ML; MG/100ML; MG/100ML; MG/100ML
INJECTION, SOLUTION INTRAVENOUS CONTINUOUS
Status: DISCONTINUED | OUTPATIENT
Start: 2020-12-23 | End: 2020-12-23 | Stop reason: HOSPADM

## 2020-12-23 RX ORDER — LIDOCAINE HYDROCHLORIDE 10 MG/ML
30 INJECTION, SOLUTION EPIDURAL; INFILTRATION; INTRACAUDAL; PERINEURAL ONCE
Status: DISCONTINUED | OUTPATIENT
Start: 2020-12-23 | End: 2020-12-23 | Stop reason: HOSPADM

## 2020-12-23 NOTE — LACTATION NOTE
This note was copied from a baby's chart. LACTATION NOTE - INFANT    Evaluation Type  Evaluation Type: Inpatient    Problems & Assessment  Problems Diagnosed or Identified: Premature;Sleepy; Latch difficulty  Problems: comment/detail: 36 6/7wks  Infant Ass

## 2020-12-23 NOTE — LACTATION NOTE
LACTATION NOTE - MOTHER      Evaluation Type: Inpatient    Problems identified  Problems identified: Knowledge deficit; Unable to acheive sustained latch  Problems Identified Other: Infant LPT    Maternal history  Other/comment: arrived in L&D at 9 cm    Br

## 2020-12-23 NOTE — PROGRESS NOTES
Patient up to bathroom with assist x 2. Voided 350. Patient transferred to mother/baby room 357 per wheelchair in stable condition with baby and personal belongings. Accompanied by significant other and staff. Report given to mother/baby RN.

## 2020-12-23 NOTE — PROGRESS NOTES
Pt is a 27year old female admitted to [place] [address]. Patient presents with:  R/o Labor: CTX ONSET 0200, LOF 0130, STATES +FM     Pt is  36w6d intra-uterine pregnancy. History obtained, consents signed. Oriented to room, staff, and plan of care.

## 2020-12-23 NOTE — H&P
Therese 86 Patient Status:  Inpatient    1990 MRN [id number]   Location [facility] Attending Belinda Hong MD   Hosp Day # 0 PCP Garry Olivia MD     Date of A PUMP IN STYLE, Disp: 1 each, Rfl: 0        Review of Systems:   As documented in HPI        Physical Exam:   Temp:  [98.9 °F (37.2 °C)] 98.9 °F (37.2 °C)  Pulse:  [105] 105  BP: (126)/(74) 126/74    Constitutional: alert, appears stated age and cooperative

## 2020-12-23 NOTE — LACTATION NOTE
This note was copied from a baby's chart.   LACTATION NOTE - INFANT    Evaluation Type  Evaluation Type: Inpatient    Problems & Assessment  Problems Diagnosed or Identified: Premature;Sleepy  Problems: comment/detail: 36 6/7wks  Infant Assessment: Minimal

## 2020-12-24 VITALS
DIASTOLIC BLOOD PRESSURE: 67 MMHG | TEMPERATURE: 98 F | HEART RATE: 80 BPM | RESPIRATION RATE: 14 BRPM | SYSTOLIC BLOOD PRESSURE: 118 MMHG

## 2020-12-24 RX ORDER — PSEUDOEPHEDRINE HCL 30 MG
100 TABLET ORAL 2 TIMES DAILY
Qty: 30 CAPSULE | Refills: 0 | Status: SHIPPED | OUTPATIENT
Start: 2020-12-24 | End: 2021-02-10

## 2020-12-24 RX ORDER — IBUPROFEN 600 MG/1
600 TABLET ORAL EVERY 8 HOURS PRN
Qty: 30 TABLET | Refills: 0 | Status: SHIPPED | OUTPATIENT
Start: 2020-12-24

## 2020-12-24 NOTE — PLAN OF CARE
Discharge instructions reviewed with patient and significant other via  language line. Instructed to followup with Dr. Rosalind Morelos in 6 weeks and to maintain pelvic rest until that time.  Instructed to call for concerns including heavy bleeding

## 2020-12-24 NOTE — LACTATION NOTE
LACTATION NOTE - MOTHER      Evaluation Type: Inpatient    Problems identified  Problems identified: Knowledge deficit  Problems Identified Other: Infant LPT         Breastfeeding goal  Breastfeeding goal: To maintain breast milk feeding per patient goal

## 2020-12-24 NOTE — DISCHARGE SUMMARY
[facility]    Discharge Summary/Discharge Note    Tessy Fajarod Patient Status:  Inpatient    1990 MRN [id number]   [facility] Attending Minnie Eldridge MD   Hosp Day # 1 PCP Saji Chris MD       Subject Cloudy (A) 07/20/2020    SPECGRAVITY 1.020 12/19/2020    PROUR Negative 07/20/2020    GLUUR neg 12/19/2020    KETUR 20  (A) 07/20/2020    BILUR Negative 07/20/2020    BLOODURINE Negative 07/20/2020    NITRITE neg 12/19/2020    UROBILINOGEN <2.0 07/20/2020 Girl [[id number]]   7 lb 5.5 oz (3.33 kg)    Apgars: 1 minute: 7                 5 minutes: 9                           10 minutes:        Intrapartum Complications: None  Feeding Method: breast fed  Rh Immune Globulin Given: no  Rubella Vaccine Given: no

## 2020-12-30 NOTE — L&D DELIVERY NOTE
Connie Partida [[id number]]    Labor Events     labor?: No   steroids?: None  Antibiotics received during labor?: No  Antibiotics (enter # doses in comment): none  Rupture date/time: 2020 0130     Rupture type: SROM  Fluid color: Respiratory effort Absent Weak cry; hypoventilation Good, crying              1 Minute:  5 Minute:  10 Minute:  15 Minute:  20 Minute:    Skin color: 0  1       Heart rate: 2  2       Reflex irritablity: 2  2       Muscle tone: 1  2       Respiratory eff Ita Keys MD   12/30/2020  11:57 AM

## 2021-01-10 ENCOUNTER — TELEPHONE (OUTPATIENT)
Dept: OBGYN UNIT | Facility: HOSPITAL | Age: 31
End: 2021-01-10

## 2021-01-11 ENCOUNTER — TELEPHONE (OUTPATIENT)
Dept: OBGYN CLINIC | Facility: CLINIC | Age: 31
End: 2021-01-11

## 2021-01-11 RX ORDER — .BETA.-CAROTENE, ASCORBIC ACID, CHOLECALCIFEROL, .ALPHA.-TOCOPHEROL ACETATE, DL-, THIAMINE, RIBOFLAVIN, NIACINAMIDE, PYRIDOXINE HYDROCHLORIDE, FOLIC ACID, CYANOCOBALAMIN, CALCIUM PANTOTHENATE, CALCIUM CARBONATE, FERROUS FUMARATE, ZINC OXIDE AND DOCUSATE SODIUM 1000; 100; 400; 30; 3; 3; 15; 20; 1; 12; 7; 200; 29; 20; 25 [IU]/1; MG/1; [IU]/1; MG/1; MG/1; MG/1; MG/1; MG/1; MG/1; UG/1; MG/1; MG/1; MG/1; MG/1; MG/1
1 TABLET ORAL DAILY
Qty: 90 TABLET | Refills: 3 | Status: SHIPPED | OUTPATIENT
Start: 2021-01-11

## 2021-02-01 ENCOUNTER — OFFICE VISIT (OUTPATIENT)
Dept: OBGYN CLINIC | Facility: CLINIC | Age: 31
End: 2021-02-01
Payer: MEDICAID

## 2021-02-01 VITALS
SYSTOLIC BLOOD PRESSURE: 96 MMHG | HEART RATE: 79 BPM | WEIGHT: 192 LBS | BODY MASS INDEX: 33 KG/M2 | DIASTOLIC BLOOD PRESSURE: 61 MMHG

## 2021-02-01 DIAGNOSIS — N61.0 MASTITIS, LEFT, ACUTE: ICD-10-CM

## 2021-02-01 PROBLEM — Z34.90 SUPERVISION OF NORMAL PREGNANCY (HCC): Status: RESOLVED | Noted: 2020-09-04 | Resolved: 2021-02-01

## 2021-02-01 PROBLEM — Z34.90 SUPERVISION OF NORMAL PREGNANCY: Status: RESOLVED | Noted: 2020-09-04 | Resolved: 2021-02-01

## 2021-02-01 PROBLEM — Z34.90 PREGNANCY (HCC): Status: RESOLVED | Noted: 2020-12-23 | Resolved: 2021-02-01

## 2021-02-01 PROBLEM — Z34.90 PREGNANCY: Status: RESOLVED | Noted: 2020-12-23 | Resolved: 2021-02-01

## 2021-02-01 PROCEDURE — 99213 OFFICE O/P EST LOW 20 MIN: CPT | Performed by: OBSTETRICS & GYNECOLOGY

## 2021-02-01 PROCEDURE — 3078F DIAST BP <80 MM HG: CPT | Performed by: OBSTETRICS & GYNECOLOGY

## 2021-02-01 PROCEDURE — 3074F SYST BP LT 130 MM HG: CPT | Performed by: OBSTETRICS & GYNECOLOGY

## 2021-02-01 NOTE — PROGRESS NOTES
HPI:    Patient ID: Corbin Brown is a 27year old year old female. HPI  Problem visit    Status post normal vaginal delivery at 36-6/7 weeks gestational age. A girl. Is breast-feeding.   Brings with her a complaint of 4 days of left breast tenderness wi appearance and bowel sounds are normal. She exhibits no mass. There is no hepatosplenomegaly. There is no tenderness. There is no rebound and no CVA tenderness. No hernia.  Hernia negative in the ventral area,  negative in the right inguinal area and negati (Patient not taking: Reported on 2/1/2021 ), Disp: 30 capsule, Rfl: 0    •  ibuprofen 600 MG Oral Tab, Take 1 tablet (600 mg total) by mouth every 8 (eight) hours as needed for Pain.  (Patient not taking: Reported on 2/1/2021 ), Disp: 30 tablet, Rfl: 0    •

## 2021-02-10 ENCOUNTER — OFFICE VISIT (OUTPATIENT)
Dept: OBGYN CLINIC | Facility: CLINIC | Age: 31
End: 2021-02-10
Payer: MEDICAID

## 2021-02-10 VITALS — WEIGHT: 191 LBS | BODY MASS INDEX: 33 KG/M2

## 2021-02-10 DIAGNOSIS — N61.0 MASTITIS, LEFT, ACUTE: Primary | ICD-10-CM

## 2021-02-10 PROCEDURE — 99212 OFFICE O/P EST SF 10 MIN: CPT | Performed by: OBSTETRICS & GYNECOLOGY

## 2021-03-03 ENCOUNTER — TELEPHONE (OUTPATIENT)
Dept: OBGYN CLINIC | Facility: CLINIC | Age: 31
End: 2021-03-03

## 2021-03-03 NOTE — TELEPHONE ENCOUNTER
dropped off FMLA forms. No hippa form signed nor $25 fee paid. He was given HIPPA form for wife to sign and bring back.   was also advised about the $24  FMLA form emailed to forms dept

## 2021-03-04 ENCOUNTER — MED REC SCAN ONLY (OUTPATIENT)
Dept: ADMINISTRATIVE | Age: 31
End: 2021-03-04

## 2021-03-05 NOTE — TELEPHONE ENCOUNTER
Dr. Juliet Azar     Please sign off on form: FMLA  -Highlight the patient and hit \"Chart\" button.   -In Chart Review, w/in the Encounter tab - click 1 time on the Telephone call encounter for 3/3/21 Scroll down the telephone encounter.  -Click \"scan on\" blue

## 2021-03-08 NOTE — TELEPHONE ENCOUNTER
FMLA completed and faxed to [phone]. Pt is aware and will  copy at [facility] .  Please print out for pt

## 2021-03-17 ENCOUNTER — TELEPHONE (OUTPATIENT)
Dept: OBGYN CLINIC | Facility: CLINIC | Age: 31
End: 2021-03-17

## 2021-03-17 NOTE — TELEPHONE ENCOUNTER
Patient needs a letter stating she is cleared to return to work. Please call when it is ready to  at the [address] [place] [address] office.

## 2021-03-17 NOTE — TELEPHONE ENCOUNTER
Patient name and  verified. Letter generated as approved by [employer]. Patient will  letter in ADO. Patient aware what time office closes in ADO today.

## 2021-04-29 ENCOUNTER — OFFICE VISIT (OUTPATIENT)
Dept: OBGYN CLINIC | Facility: CLINIC | Age: 31
End: 2021-04-29
Payer: MEDICAID

## 2021-04-29 VITALS — BODY MASS INDEX: 34 KG/M2 | WEIGHT: 200 LBS

## 2021-04-29 DIAGNOSIS — N64.4 BREAST PAIN, RIGHT: Primary | ICD-10-CM

## 2021-04-29 PROBLEM — O99.210 OBESITY AFFECTING PREGNANCY, ANTEPARTUM: Status: RESOLVED | Noted: 2020-09-04 | Resolved: 2021-04-29

## 2021-04-29 PROBLEM — O99.210 OBESITY AFFECTING PREGNANCY, ANTEPARTUM (HCC): Status: RESOLVED | Noted: 2020-09-04 | Resolved: 2021-04-29

## 2021-04-29 PROBLEM — K35.80 ACUTE APPENDICITIS: Status: RESOLVED | Noted: 2020-07-20 | Resolved: 2021-04-29

## 2021-04-29 PROBLEM — N61.0 MASTITIS, LEFT, ACUTE: Status: RESOLVED | Noted: 2021-02-01 | Resolved: 2021-04-29

## 2021-04-29 PROBLEM — K35.80 ACUTE APPENDICITIS, UNSPECIFIED ACUTE APPENDICITIS TYPE: Status: RESOLVED | Noted: 2020-07-20 | Resolved: 2021-04-29

## 2021-04-29 PROCEDURE — 99213 OFFICE O/P EST LOW 20 MIN: CPT | Performed by: OBSTETRICS & GYNECOLOGY

## 2021-04-29 NOTE — PROGRESS NOTES
HPI/Subjective:   Patient ID: Ian Forde is a 27year old female. HPI  GYN problem visit  Complains of right breast pain and feeling some little firm areas in the lateral aspect. Patient is breast-feeding. Denies fever or chills.   She has been starti

## 2025-05-20 ENCOUNTER — HOSPITAL ENCOUNTER (OUTPATIENT)
Age: 35
Discharge: ANOTHER HOSPITAL | End: 2025-05-20
Payer: MEDICAID

## 2025-05-20 ENCOUNTER — HOSPITAL ENCOUNTER (EMERGENCY)
Facility: HOSPITAL | Age: 35
Discharge: HOME | End: 2025-05-21
Attending: EMERGENCY MEDICINE
Payer: MEDICAID

## 2025-05-20 ENCOUNTER — APPOINTMENT (OUTPATIENT)
Dept: ULTRASOUND IMAGING | Facility: HOSPITAL | Age: 35
End: 2025-05-20
Attending: EMERGENCY MEDICINE
Payer: MEDICAID

## 2025-05-20 VITALS
HEART RATE: 71 BPM | DIASTOLIC BLOOD PRESSURE: 74 MMHG | OXYGEN SATURATION: 99 % | TEMPERATURE: 97 F | RESPIRATION RATE: 18 BRPM | SYSTOLIC BLOOD PRESSURE: 107 MMHG

## 2025-05-20 DIAGNOSIS — R07.9 CHEST PAIN OF UNCERTAIN ETIOLOGY: Primary | ICD-10-CM

## 2025-05-20 DIAGNOSIS — K80.50 BILIARY COLIC: Primary | ICD-10-CM

## 2025-05-20 LAB
ALBUMIN SERPL-MCNC: 4.6 G/DL (ref 3.2–4.8)
ALP LIVER SERPL-CCNC: 124 U/L (ref 37–98)
ALT SERPL-CCNC: 115 U/L (ref 10–49)
ANION GAP SERPL CALC-SCNC: 10 MMOL/L (ref 0–18)
AST SERPL-CCNC: 190 U/L (ref ?–34)
BASOPHILS # BLD AUTO: 0.03 X10(3) UL (ref 0–0.2)
BASOPHILS NFR BLD AUTO: 0.2 %
BILIRUB DIRECT SERPL-MCNC: 0.2 MG/DL (ref ?–0.3)
BILIRUB SERPL-MCNC: 0.6 MG/DL (ref 0.3–1.2)
BUN BLD-MCNC: 12 MG/DL (ref 9–23)
BUN/CREAT SERPL: 16.7 (ref 10–20)
CALCIUM BLD-MCNC: 9.4 MG/DL (ref 8.7–10.4)
CHLORIDE SERPL-SCNC: 104 MMOL/L (ref 98–112)
CO2 SERPL-SCNC: 26 MMOL/L (ref 21–32)
CREAT BLD-MCNC: 0.72 MG/DL (ref 0.55–1.02)
DEPRECATED RDW RBC AUTO: 44.2 FL (ref 35.1–46.3)
EGFRCR SERPLBLD CKD-EPI 2021: 112 ML/MIN/1.73M2 (ref 60–?)
EOSINOPHIL # BLD AUTO: 0.04 X10(3) UL (ref 0–0.7)
EOSINOPHIL NFR BLD AUTO: 0.3 %
ERYTHROCYTE [DISTWIDTH] IN BLOOD BY AUTOMATED COUNT: 12.5 % (ref 11–15)
GLUCOSE BLD-MCNC: 112 MG/DL (ref 70–99)
HCT VFR BLD AUTO: 43.8 % (ref 35–48)
HGB BLD-MCNC: 15.1 G/DL (ref 12–16)
IMM GRANULOCYTES # BLD AUTO: 0.03 X10(3) UL (ref 0–1)
IMM GRANULOCYTES NFR BLD: 0.2 %
LIPASE SERPL-CCNC: 31 U/L (ref 12–53)
LYMPHOCYTES # BLD AUTO: 2.11 X10(3) UL (ref 1–4)
LYMPHOCYTES NFR BLD AUTO: 17 %
MCH RBC QN AUTO: 33 PG (ref 26–34)
MCHC RBC AUTO-ENTMCNC: 34.5 G/DL (ref 31–37)
MCV RBC AUTO: 95.8 FL (ref 80–100)
MONOCYTES # BLD AUTO: 0.65 X10(3) UL (ref 0.1–1)
MONOCYTES NFR BLD AUTO: 5.2 %
NEUTROPHILS # BLD AUTO: 9.56 X10 (3) UL (ref 1.5–7.7)
NEUTROPHILS # BLD AUTO: 9.56 X10(3) UL (ref 1.5–7.7)
NEUTROPHILS NFR BLD AUTO: 77.1 %
OSMOLALITY SERPL CALC.SUM OF ELEC: 291 MOSM/KG (ref 275–295)
PLATELET # BLD AUTO: 237 10(3)UL (ref 150–450)
POTASSIUM SERPL-SCNC: 3.9 MMOL/L (ref 3.5–5.1)
PROT SERPL-MCNC: 6.9 G/DL (ref 5.7–8.2)
RBC # BLD AUTO: 4.57 X10(6)UL (ref 3.8–5.3)
SODIUM SERPL-SCNC: 140 MMOL/L (ref 136–145)
TROPONIN I SERPL HS-MCNC: <3 NG/L (ref ?–34)
WBC # BLD AUTO: 12.4 X10(3) UL (ref 4–11)

## 2025-05-20 PROCEDURE — 93000 ELECTROCARDIOGRAM COMPLETE: CPT | Performed by: NURSE PRACTITIONER

## 2025-05-20 PROCEDURE — 96374 THER/PROPH/DIAG INJ IV PUSH: CPT

## 2025-05-20 PROCEDURE — 80076 HEPATIC FUNCTION PANEL: CPT | Performed by: EMERGENCY MEDICINE

## 2025-05-20 PROCEDURE — 80048 BASIC METABOLIC PNL TOTAL CA: CPT | Performed by: EMERGENCY MEDICINE

## 2025-05-20 PROCEDURE — 76705 ECHO EXAM OF ABDOMEN: CPT | Performed by: EMERGENCY MEDICINE

## 2025-05-20 PROCEDURE — 99285 EMERGENCY DEPT VISIT HI MDM: CPT

## 2025-05-20 PROCEDURE — 96375 TX/PRO/DX INJ NEW DRUG ADDON: CPT

## 2025-05-20 PROCEDURE — 83690 ASSAY OF LIPASE: CPT | Performed by: EMERGENCY MEDICINE

## 2025-05-20 PROCEDURE — 85025 COMPLETE CBC W/AUTO DIFF WBC: CPT | Performed by: EMERGENCY MEDICINE

## 2025-05-20 PROCEDURE — 99215 OFFICE O/P EST HI 40 MIN: CPT | Performed by: NURSE PRACTITIONER

## 2025-05-20 PROCEDURE — 84484 ASSAY OF TROPONIN QUANT: CPT | Performed by: EMERGENCY MEDICINE

## 2025-05-20 RX ORDER — ONDANSETRON 2 MG/ML
4 INJECTION INTRAMUSCULAR; INTRAVENOUS ONCE
Status: COMPLETED | OUTPATIENT
Start: 2025-05-20 | End: 2025-05-20

## 2025-05-20 RX ORDER — FAMOTIDINE 10 MG/ML
20 INJECTION, SOLUTION INTRAVENOUS ONCE
Status: COMPLETED | OUTPATIENT
Start: 2025-05-20 | End: 2025-05-20

## 2025-05-20 RX ORDER — MORPHINE SULFATE 4 MG/ML
4 INJECTION, SOLUTION INTRAMUSCULAR; INTRAVENOUS ONCE
Status: COMPLETED | OUTPATIENT
Start: 2025-05-20 | End: 2025-05-20

## 2025-05-20 RX ORDER — MAGNESIUM HYDROXIDE/ALUMINUM HYDROXICE/SIMETHICONE 120; 1200; 1200 MG/30ML; MG/30ML; MG/30ML
30 SUSPENSION ORAL ONCE
Status: COMPLETED | OUTPATIENT
Start: 2025-05-20 | End: 2025-05-20

## 2025-05-20 NOTE — ED INITIAL ASSESSMENT (HPI)
Patient was eating about 1 hour before coming to  and started to develop lower chest pain that wraps around her chest to her back. Patient vomited here. Provider came to assess patient and recommended patient goes to [facility] ER for further work up. Patient verbally agreed to go to ER, however requested an ambulance to take her. 911 called and ambulance on the way.

## 2025-05-20 NOTE — ED PROVIDER NOTES
Patient Seen in: [facility]    History   CC: chest pain  HPI: Cindy Reyes 34 year old female  who presents c/o bilateral lower chest pain that radiates around into her back starting 1 hour prior to arrival in the immediate care while eating lentils and eggs.  States she vomited upon arrival to the immediate care x 1.  Denies constipation, diarrhea, fever, rash, difficulty breathing, recent travel/surgery/injury or lower extremity swelling.  Denies hormone/OCP use.  Denies personal or family history of blood clotting/bleeding disorders or heart disease at a young age.    Past Medical History[1]    Past Surgical History[2]    Family History[3]    Short Social Hx on File[4]    ROS:  Systems reviewed: All pertinent positives noted in HPI. Unless otherwise noted, additional systems reviewed are negative.   Vital signs reviewed.    Positive for stated complaint: abd pain; vomitting  Other systems are as noted in HPI.  Constitutional and vital signs reviewed.      All other systems reviewed and negative except as noted above.    PSFH elements reviewed from today and agreed except as otherwise stated in HPI.             Constitutional and vital signs reviewed.        Physical Exam     ED Triage Vitals [05/20/25 1736]   /74   Pulse 71   Resp 18   Temp 97.4 °F (36.3 °C)   Temp src Oral   SpO2 99 %   O2 Device None (Room air)       Current:/74   Pulse 71   Temp 97.4 °F (36.3 °C) (Oral)   Resp 18   LMP 05/04/2025 (Exact Date)   SpO2 99%   Breastfeeding No         PE:  General - Appears well, non-toxic and in NAD  Head - Appears symmetrical without deformity/swelling cranium, scalp, or facial bones  Eyes - sclera not injected, no discharge noted, no periorbital edema  Neck - no significant adenopathy  Resp - Lung sounds clear bilaterally and wob unlabored, good aeration with equal, even expansion bilaterally   CV - RRR  GI - Appears round and flat, BS +x4 quadrants, no tenderness/guarding with  palpation  Skin - no rashes or petechiae noted, pink warm and dry throughout, mmm, cap refill <2seconds  Neuro - A&O x4, steady gait  MSK - makes purposeful movements of all extremities  Psych - Interactive and appropriate      ED Course   Labs Reviewed - No data to display  EKG    Rate, intervals and axes as noted on EKG Report.  Rate: 74  Rhythm: Sinus Rhythm  Reading: NSR without previous to compare             MDM     DDx: Arrhythmia, ACS, PE, aortic aneurysm, cholecystitis, gastroenteritis    Discussed with patient need for further evaluation/management in the emergency room at this time for diagnostics unavailable at this immediate care facility.  Patient is agreeable and will go via EMS to [facility] ED.  Patient is primarily Bahraini-speaking. Patient is historian and demonstrates understanding of all instruction and agrees with plan of care.  This case was also discussed with Dr. Christianson who agrees with plan of care.      Disposition and Plan     Clinical Impression:  1. Chest pain of uncertain etiology        Disposition:  Ic to ed    Follow-up:  No follow-up provider specified.    Medications Prescribed:  Current Discharge Medication List                         [1]   Past Medical History:   Mastitis, left, acute    Pregnant (HCC)   [2]   Past Surgical History:  Procedure Laterality Date    Appendectomy  07/20/2020   [3]   Family History  Problem Relation Age of Onset    Hypertension Mother     Hypertension Maternal Grandmother     No Known Problems Paternal Grandmother     No Known Problems Paternal Grandfather    [4]   Social History  Socioeconomic History    Marital status:    Tobacco Use    Smoking status: Never    Smokeless tobacco: Never   Vaping Use    Vaping status: Never Used   Substance and Sexual Activity    Alcohol use: Never    Drug use: Never     Social Drivers of Health      [facility]

## 2025-05-21 VITALS
WEIGHT: 200 LBS | SYSTOLIC BLOOD PRESSURE: 110 MMHG | RESPIRATION RATE: 18 BRPM | TEMPERATURE: 98 F | OXYGEN SATURATION: 98 % | DIASTOLIC BLOOD PRESSURE: 70 MMHG | BODY MASS INDEX: 34 KG/M2 | HEART RATE: 64 BPM

## 2025-05-21 LAB
ATRIAL RATE: 74 BPM
P AXIS: 46 DEGREES
P-R INTERVAL: 156 MS
Q-T INTERVAL: 412 MS
QRS DURATION: 92 MS
QTC CALCULATION (BEZET): 457 MS
R AXIS: 20 DEGREES
T AXIS: 19 DEGREES
VENTRICULAR RATE: 74 BPM

## 2025-05-21 PROCEDURE — 96376 TX/PRO/DX INJ SAME DRUG ADON: CPT

## 2025-05-21 RX ORDER — ONDANSETRON 2 MG/ML
4 INJECTION INTRAMUSCULAR; INTRAVENOUS ONCE
Status: COMPLETED | OUTPATIENT
Start: 2025-05-21 | End: 2025-05-21

## 2025-05-21 RX ORDER — ONDANSETRON 2 MG/ML
INJECTION INTRAMUSCULAR; INTRAVENOUS
Status: COMPLETED
Start: 2025-05-21 | End: 2025-05-21

## 2025-05-21 RX ORDER — ONDANSETRON 4 MG/1
4 TABLET, ORALLY DISINTEGRATING ORAL EVERY 4 HOURS PRN
Qty: 15 TABLET | Refills: 0 | Status: SHIPPED | OUTPATIENT
Start: 2025-05-21

## 2025-05-21 NOTE — ED QUICK NOTES
Rounding Completed    Plan of Care reviewed. Waiting for lab results.  Elimination needs assessed.  Provided assistance to restroom.    Bed is locked and in lowest position. Call light within reach.

## 2025-05-21 NOTE — ED PROVIDER NOTES
Patient Seen in: [facility] Emergency Department    History     Chief Complaint   Patient presents with    Epigastric Pain       HPI    Patient presents to the ED complaining of epigastric abdominal pain that radiates to her back.  Symptoms started today after eating dinner.  Associated nausea. She denies other complaints.  History of similar pain in the past.      History reviewed. Past Medical History[1]    History reviewed. Past Surgical History[2]      Medications :  Prescriptions Prior to Admission[3]     Family History[4]    Smoking Status: Social Hx on file[5]    Constitutional and vital signs reviewed.      Social History and Family History elements reviewed from today, pertinent positives to the presenting problem noted.    Physical Exam     ED Triage Vitals   BP 05/20/25 1836 113/74   Pulse 05/20/25 1836 74   Resp 05/20/25 1836 18   Temp 05/20/25 1836 98 °F (36.7 °C)   Temp src --    SpO2 05/20/25 1836 98 %   O2 Device 05/20/25 2100 None (Room air)       All measures to prevent infection transmission during my interaction with the patient were taken. Handwashing was performed prior to and after the exam.  Stethoscope and any equipment used during my examination was cleaned with super sani-cloth germicidal wipes following the exam.     Physical Exam  Vitals and nursing note reviewed.   Constitutional:       General: She is not in acute distress.     Appearance: She is well-developed. She is not ill-appearing or toxic-appearing.   HENT:      Head: Normocephalic and atraumatic.   Eyes:      General:         Right eye: No discharge.         Left eye: No discharge.      Conjunctiva/sclera: Conjunctivae normal.   Neck:      Trachea: No tracheal deviation.   Cardiovascular:      Rate and Rhythm: Normal rate.   Pulmonary:      Effort: Pulmonary effort is normal. No respiratory distress.      Breath sounds: No stridor.   Abdominal:      General: There is no distension.      Palpations: Abdomen is soft.       Tenderness: There is abdominal tenderness.      Comments: Epigastric tenderness without rebound or guarding   Musculoskeletal:         General: No deformity.   Skin:     General: Skin is warm and dry.   Neurological:      Mental Status: She is alert and oriented to person, place, and time.   Psychiatric:         Mood and Affect: Mood normal.         Behavior: Behavior normal.         ED Course        Labs Reviewed   BASIC METABOLIC PANEL (8) - Abnormal; Notable for the following components:       Result Value    Glucose 112 (*)     All other components within normal limits   HEPATIC FUNCTION PANEL (7) - Abnormal; Notable for the following components:     (*)      (*)     Alkaline Phosphatase 124 (*)     All other components within normal limits   CBC WITH DIFFERENTIAL WITH PLATELET - Abnormal; Notable for the following components:    WBC 12.4 (*)     Neutrophil Absolute Prelim 9.56 (*)     Neutrophil Absolute 9.56 (*)     All other components within normal limits   LIPASE - Normal   TROPONIN I HIGH SENSITIVITY - Normal       As Interpreted by me    Imaging Results Available and Reviewed while in ED: RUQ US    COMPARISON: None.      IMPRESSION:  Gallstones measuring up to 4 cm.  No other sonographic evidence of acute cholecystitis, including no gallbladder wall thickening, significant gallbladder distention or pericholecystic fluid.    Equivocal Fernandez sign as patient was premedicated.    Dilated CBD measuring up to 8 mm, nonspecific but distal choledocholithiasis is not excluded and could be better assessed with MRCP.    ED Medications Administered:   Medications   ondansetron (Zofran) 4 MG/2ML injection 4 mg (4 mg Intravenous Given 5/20/25 2024)   alum-mag hydroxide-simethicone (Maalox) 200-200-20 MG/5ML oral suspension 30 mL (30 mL Oral Given 5/20/25 2024)   famotidine (Pepcid) 20 mg/2mL injection 20 mg (20 mg Intravenous Given 5/20/25 2024)   morphINE PF 4 MG/ML injection 4 mg (4 mg Intravenous  Given 5/20/25 2220)   ondansetron (Zofran) 4 MG/2ML injection 4 mg (4 mg Intravenous Given 5/21/25 0015)         MDM     Vitals:    05/20/25 2100 05/20/25 2200 05/20/25 2300 05/21/25 0015   BP: 116/70 111/69 108/68 110/70   Pulse: 70 68 68 64   Resp: 18 18 18 18   Temp:       SpO2: 98% 99% 99% 98%   Weight:         *I personally reviewed and interpreted all ED vitals.    Pulse Ox: 98%, Room air, Normal     Monitor Interpretation:   normal sinus rhythm as interpreted by me.  The cardiac monitor was ordered to monitor heart rate.    Differential Diagnosis/ Diagnostic Considerations: GERD, gastritis, PUD, pancreatitis, biliary colic, cholecystitis, other    Complicating Factors: The patient already has does not have any pertinent problems on file. to contribute to the complexity of this ED evaluation.    Medical Decision Making  Patient presents to the ED with epigastric abdominal pain that radiates to her back and nausea.  Concern for biliary cause.  Laboratory testing sent and trace LFT elevation noted.  Ultrasound of the gallbladder obtained which shows cholelithiasis without evidence for cholecystitis and mild CBD dilation.  No obvious CBD stone.  Patient symptoms resolved at this point.  No further complaints.  Patient declines hospital admission and adamant on going home.  She understands that she should return to the ED immediately for recurrence of pain or fevers.  Discussed close outpatient surgical follow-up for cholecystectomy.    Problems Addressed:  Biliary colic: acute illness or injury    Amount and/or Complexity of Data Reviewed  Labs: ordered. Decision-making details documented in ED Course.  Radiology: ordered and independent interpretation performed. Decision-making details documented in ED Course.     Details: I personally reviewed the patient's gallbladder ultrasound images and noted multiple gallstones    Risk  Prescription drug management.  Parenteral controlled substances.        Condition upon  leaving the department: Stable    Disposition and Plan     Clinical Impression:  1. Biliary colic        Disposition:  Discharge    Follow-up:  Nicolás Ratliff MD  [address]  [facility] [address]  [phone]    Schedule an appointment as soon as possible for a visit in 3 day(s)      Remberto Rodgers MD  [address]  [address]  [facility] [address]  [phone]    Schedule an appointment as soon as possible for a visit in 1 week(s)        Medications Prescribed:  Discharge Medication List as of 5/21/2025 12:16 AM        START taking these medications    Details   ondansetron 4 MG Oral Tablet Dispersible Take 1 tablet (4 mg total) by mouth every 4 (four) hours as needed for Nausea., Normal, Disp-15 tablet, R-0                              [1]   Past Medical History:   Mastitis, left, acute    Pregnant (HCC)   [2]   Past Surgical History:  Procedure Laterality Date    Appendectomy  07/20/2020   [3] (Not in a hospital admission)  [4]   Family History  Problem Relation Age of Onset    Hypertension Mother     Hypertension Maternal Grandmother     No Known Problems Paternal Grandmother     No Known Problems Paternal Grandfather    [5]   Social History  Socioeconomic History    Marital status:    Tobacco Use    Smoking status: Never    Smokeless tobacco: Never   Vaping Use    Vaping status: Never Used   Substance and Sexual Activity    Alcohol use: Never    Drug use: Never

## 2025-05-27 ENCOUNTER — OFFICE VISIT (OUTPATIENT)
Dept: SURGERY | Facility: CLINIC | Age: 35
End: 2025-05-27

## 2025-05-27 VITALS — DIASTOLIC BLOOD PRESSURE: 69 MMHG | HEART RATE: 67 BPM | SYSTOLIC BLOOD PRESSURE: 106 MMHG

## 2025-05-27 DIAGNOSIS — K80.20 CALCULUS OF GALLBLADDER WITHOUT CHOLECYSTITIS WITHOUT OBSTRUCTION: Primary | ICD-10-CM

## 2025-05-27 PROCEDURE — 99204 OFFICE O/P NEW MOD 45 MIN: CPT | Performed by: SURGERY

## 2025-05-27 NOTE — H&P
HPI:      Patient ID: Cindy Reyes is a 34 year old female presenting with   Chief Complaint   Patient presents with    Gallbladder     Pt c/o RUQ pain, nause but no vomiting.     .    Gallbladder        Past Medical History[1]  Past Surgical History[2]  Family History[3]  Social History     Socioeconomic History    Marital status:      Spouse name: Not on file    Number of children: Not on file    Years of education: Not on file    Highest education level: Not on file   Occupational History    Not on file   Tobacco Use    Smoking status: Never    Smokeless tobacco: Never   Vaping Use    Vaping status: Never Used   Substance and Sexual Activity    Alcohol use: Never    Drug use: Never    Sexual activity: Not on file   Other Topics Concern    Not on file   Social History Narrative    Not on file     Social Drivers of Health     Food Insecurity: Not on file   Transportation Needs: Not on file   Stress: Not on file   Housing Stability: At Risk (8/18/2023)    Received from [facility]     Living Situation: Not on file     Housing Problems: Not on file       Review of Systems   Constitutional: Negative.    HENT: Negative.     Eyes: Negative.    Respiratory: Negative.     Cardiovascular: Negative.    Gastrointestinal: Negative.    Endocrine: Negative.    Genitourinary: Negative.    Musculoskeletal: Negative.    Skin: Negative.    Allergic/Immunologic: Negative.    Neurological: Negative.    Hematological:  Does not bruise/bleed easily.   Psychiatric/Behavioral: Negative.           Current Medications[4]    Allergies:Allergies[5]   PHYSICAL EXAM:   /69   Pulse 67   LMP 05/04/2025 (Exact Date)   Physical Exam  Vitals reviewed.   Constitutional:       Appearance: Normal appearance. She is well-developed.   HENT:      Head: Normocephalic and atraumatic.   Cardiovascular:      Rate and Rhythm: Normal rate and regular rhythm.   Pulmonary:      Effort: Pulmonary effort is normal.      Breath sounds:  Normal breath sounds.   Abdominal:      General: There is no distension.      Palpations: Abdomen is soft. There is no mass.      Tenderness: There is no abdominal tenderness. There is no guarding or rebound.   Musculoskeletal:         General: Normal range of motion.      Cervical back: Normal range of motion and neck supple.   Skin:     General: Skin is warm and dry.   Neurological:      Mental Status: She is alert and oriented to person, place, and time.   Psychiatric:         Speech: Speech normal.         Behavior: Behavior normal.                 ASSESSMENT/PLAN:   Diagnoses and all orders for this visit:    Calculus of gallbladder without cholecystitis without obstruction      Plan for lap fernando.       Remberto Rodgers MD  2025       [1]   Past Medical History:   Mastitis, left, acute    Pregnant (HCC)   [2]   Past Surgical History:  Procedure Laterality Date    Appendectomy  2020   [3]   Family History  Problem Relation Age of Onset    Hypertension Mother     Hypertension Maternal Grandmother     No Known Problems Paternal Grandmother     No Known Problems Paternal Grandfather    [4]   Current Outpatient Medications   Medication Sig Dispense Refill    ondansetron 4 MG Oral Tablet Dispersible Take 1 tablet (4 mg total) by mouth every 4 (four) hours as needed for Nausea. 15 tablet 0    Prenatal Vit-DSS-Fe Fum-FA (SE-DANI 19) 29-1 MG Oral Tab Take 1 tablet by mouth daily. 90 tablet 3    ibuprofen 600 MG Oral Tab Take 1 tablet (600 mg total) by mouth every 8 (eight) hours as needed for Pain. 30 tablet 0    Misc. Devices (BREAST PUMP) Does not apply Misc DOUBLE ELECTRIC BREAST PUMP EQUIVALENT TO MEDELA PUMP IN STYLE (Patient not taking: Reported on 2021 ) 1 each 0   [5] No Known Allergies

## 2025-05-27 NOTE — H&P (VIEW-ONLY)
HPI:      Patient ID: Cindy Reyes is a 34 year old female presenting with   Chief Complaint   Patient presents with    Gallbladder     Pt c/o RUQ pain, nause but no vomiting.     .    Gallbladder        Past Medical History[1]  Past Surgical History[2]  Family History[3]  Social History     Socioeconomic History    Marital status:      Spouse name: Not on file    Number of children: Not on file    Years of education: Not on file    Highest education level: Not on file   Occupational History    Not on file   Tobacco Use    Smoking status: Never    Smokeless tobacco: Never   Vaping Use    Vaping status: Never Used   Substance and Sexual Activity    Alcohol use: Never    Drug use: Never    Sexual activity: Not on file   Other Topics Concern    Not on file   Social History Narrative    Not on file     Social Drivers of Health     Food Insecurity: Not on file   Transportation Needs: Not on file   Stress: Not on file   Housing Stability: At Risk (8/18/2023)    [facility]     Living Situation: Not on file     Housing Problems: Not on file       Review of Systems   Constitutional: Negative.    HENT: Negative.     Eyes: Negative.    Respiratory: Negative.     Cardiovascular: Negative.    Gastrointestinal: Negative.    Endocrine: Negative.    Genitourinary: Negative.    Musculoskeletal: Negative.    Skin: Negative.    Allergic/Immunologic: Negative.    Neurological: Negative.    Hematological:  Does not bruise/bleed easily.   Psychiatric/Behavioral: Negative.           Current Medications[4]    Allergies:Allergies[5]   PHYSICAL EXAM:   /69   Pulse 67   LMP 05/04/2025 (Exact Date)   Physical Exam  Vitals reviewed.   Constitutional:       Appearance: Normal appearance. She is well-developed.   HENT:      Head: Normocephalic and atraumatic.   Cardiovascular:      Rate and Rhythm: Normal rate and regular rhythm.   Pulmonary:      Effort: Pulmonary effort is normal.      Breath sounds:  Normal breath sounds.   Abdominal:      General: There is no distension.      Palpations: Abdomen is soft. There is no mass.      Tenderness: There is no abdominal tenderness. There is no guarding or rebound.   Musculoskeletal:         General: Normal range of motion.      Cervical back: Normal range of motion and neck supple.   Skin:     General: Skin is warm and dry.   Neurological:      Mental Status: She is alert and oriented to person, place, and time.   Psychiatric:         Speech: Speech normal.         Behavior: Behavior normal.                 ASSESSMENT/PLAN:   Diagnoses and all orders for this visit:    Calculus of gallbladder without cholecystitis without obstruction      Plan for lap fernando.       Remberto Rodgers MD  2025       [1]   Past Medical History:   Mastitis, left, acute    Pregnant (HCC)   [2]   Past Surgical History:  Procedure Laterality Date    Appendectomy  2020   [3]   Family History  Problem Relation Age of Onset    Hypertension Mother     Hypertension Maternal Grandmother     No Known Problems Paternal Grandmother     No Known Problems Paternal Grandfather    [4]   Current Outpatient Medications   Medication Sig Dispense Refill    ondansetron 4 MG Oral Tablet Dispersible Take 1 tablet (4 mg total) by mouth every 4 (four) hours as needed for Nausea. 15 tablet 0    Prenatal Vit-DSS-Fe Fum-FA (SE-DANI 19) 29-1 MG Oral Tab Take 1 tablet by mouth daily. 90 tablet 3    ibuprofen 600 MG Oral Tab Take 1 tablet (600 mg total) by mouth every 8 (eight) hours as needed for Pain. 30 tablet 0    Misc. Devices (BREAST PUMP) Does not apply Misc DOUBLE ELECTRIC BREAST PUMP EQUIVALENT TO MEDELA PUMP IN STYLE (Patient not taking: Reported on 2021 ) 1 each 0   [5] No Known Allergies

## 2025-06-10 NOTE — DISCHARGE INSTRUCTIONS
CIRUGIA AMBULATORIA: INSTRUCCIONES DESPUES DE CAMILLE RECIBIDO ANESTESIA  Debido a la Anestesia y a las medicamentos que se le aplicaron alfredo la cirugia, swetha reflejos y capacidaddiscernimiento pueden verse afectados. Tambien podria tener un poco de mareo.Aparte de siguir las precauciones de sentico comun, le recomendamos lo siguiente:     El paciente debe estar acompañado por alguien hasta la mañana siguiente.     No maneje ningun vehiculo automotor ni monte bicicleta.     No tome ninguna decision importante marilyn por ejemplo firmar de documentos importantes.     No opere herramientas electricas ni electrodomesticos, tales marilyn cuchillos electricos, batidoras electricas o serruchos electricos. No ashley el cesped con cortadoras electricas. No practique deportes.     No davin ejercicio.     Para evitar las nauseas, coma menos de lo normal mas o menos la mitad de lo habitual y/o cong solo liquidos hasta la manana siguiente. Consulte con gray doctor si esta llevando farida dieta especial.     No tome bebidas alcoholicas, tranquilizantes, pastilles para dormir etc., y verifique con gray doctor acerca de cualquier medicamento que ronnie tomando actualmente.     El efecto de la medicación usada en gray anestesia habra pasado do por completo a la medianoche. Por lo tanto, puede reanudar swetha habitos cotidianos en la mañana.     Los adultos deben descansar lo joan posible por las siguientes 24 horas. Los niños deben permanecer en cama lo joan posible por las siguientes 24 horas.     Si se presenta cualquier problema, puede llamar a gray propio doctor personal o aceda al centro de [facility].    Si sigue estas instrucciones, se sentira major y estara mas seguro despues de gray cirugia ambulatoria. Sitiene cualquier pregunta, llame al hospital y pida que lo comuniquen con la enfermera de cirugia [phone], extension [phone].    Instrucciones para el leo: después de gray cirugía   Acaba de  someterse a farida cirugía. Alfredo la cirugía, le administraron un tipo de medicamento llamado anestesia para que esté relajado y no sienta dolor. Después de la cirugía, ishan vez sienta algo de dolor o náuseas. [place] es común. Estos son algunos consejos para sentirse mejor y recuperarse davie después de la cirugía.   El regreso a casa  Gray proveedor de atención médica le enseñará cómo cuidarse cuando regrese a gray casa. También responderá swetha preguntas. Pida a un familiar o amigo adulto que lo conduzca a gray casa. Alfredo las primeras 24 horas después de la cirugía, siga estas recomendaciones:   No conduzca ni use maquinaria pesada.  No tome decisiones importantes ni firme ningún documento legal.  Adminístrese los medicamentos según las indicaciones.  Evite el consumo de alcohol.  Si es necesario, coordine para que alguien se quede con usted. Esta persona puede vigilar cualquier problema que se presente y lo ayudará a permanecer seguro.  Asegúrese de asistir a todas swetha visitas de control con gray proveedor de atención médica. Y descanse después de la cirugía alfredo el tiempo que le indique gray proveedor.   Cómo sobrellevar el dolor  Si siente dolor después de la cirugía, los analgésicos lo ayudarán a sentirse mejor. [place] los analgésicos según las indicaciones, antes de que el dolor se intensifique. Además, pregunte a gray proveedor de atención médica o al farmacéutico acerca de otras formas de controlar el dolor. Estas podrían incluir aplicar calor o hielo, o hacer ejercicios de relajación. Y siga todas las instrucciones que le dé gray cirujano o enfermero.      Cumpla el cronograma de swetha medicamentos.     Consejos para jimi analgésicos  Para aliviar el dolor lo mikael posible, recuerde estos puntos:   Los analgésicos pueden causar malestar estomacal. Tomarlos con un poco de comida puede aliviar ronnie efecto.  La mayoría de los calmantes que se huan por la boca necesitan por lo menos de 20 a 30 minutos para surtir efecto.  No  espere hasta que gray dolor se vuelva intenso para sergey el analgésico que le indicaron. Intente que el momento en que puede sergey gray medicamento coincida con otra actividad. Lauren podría ser el momento antes de vestirse, rosy un paseo o sentarse a la veliz para cenar.  El estreñimiento es un efecto secundario frecuente de algunos analgésicos. Consulte a gray proveedor de atención médica antes de usar cualquier medicamento, marilyn laxantes o ablandadores de heces, para ayudar a aliviar el estreñimiento. También consulte si es preciso evitar algún tipo de alimento. Sergey mucha cantidad de líquido y comer alimentos marilyn frutas y verduras con alto contenido de fibra también puede ser beneficioso. Recuerde que no debe sergey laxantes a menos que gray cirujano se los indique.  Mezclar bebidas alcohólicas y analgésicos puede causar mareos y enlentecer gray respiración. Y hasta puede ser mortal. No cong alcohol mientras esté tomando calmantes.  Los analgésicos pueden hacer que tenga reacciones más lentas. No conduzca ni opere maquinaria mientras esté tomando analgésicos.  Gray proveedor de atención médica puede indicarle que tome acetaminofén (paracetamol) para ayudar a aliviar el dolor. Pregúntele qué cantidad debe sergey por día. El acetaminofén y otros analgésicos pueden interactuar con swetha medicamentos recetados u otros medicamentos de venta kristen (OTC, por swetha siglas en inglés). Algunos medicamentos recetados contienen acetaminofén y otros ingredientes. Combinar medicamentos recetados y acetaminofén de venta kristen para aliviar el dolor puede provocarle farida sobredosis accidental. Ita atentamente la etiqueta del envase de swetha medicamentos OTC. Purdin lo ayudará a saber con exactitud la lista de ingredientes, la cantidad que debe sergey y cualquier advertencia. Purdin también puede ayudarlo a evitar sergey demasiado acetaminofén. Si tiene preguntas o no entiende la información, pídale a gray farmacéutico o proveedor de atención médica que se la  explique antes de jimi el medicamento OTC.   Manejo de las náuseas  Algunas personas pueden sentir malestar estomacal (náuseas) después de la cirugía. Smiley suele suceder debido a la anestesia, el dolor, los analgésicos, la disminución del movimiento de la comida en el estómago o el estrés de la cirugía. Estos consejos lo ayudarán a manejar las náuseas y a comer alimentos más saludables mientras se recupera. Si seguía un plan alimentario especial antes de la cirugía, pregúntele a gray proveedor de atención médica si debe continuarlo mientras se recupera. Consulte con gray proveedor cómo debería continuar gray alimentación. Esta puede variar según el tipo de cirugía a la que se sometió. Los siguientes consejos generales pueden serle útiles:   No se fuerce a comer. Guíese por gray cuerpo para saber cuándo comer y qué cantidad.  Comience con líquidos transparentes y sopa. Estos son más fáciles de digerir.  Tan pronto marilyn se sienta listo, intente comer alimentos semisólidos. Estos incluyen puré de celsa, puré de manzana y gelatina.  Lentamente, pase a alimentos sólidos. Al principio no coma alimentos grasosos, pesados ni condimentados.  No se fuerce a hacer cabrera comidas grandes al día. En cambio, coma cantidades pequeñas, clarence con mayor frecuencia.  Cable los analgésicos con farida pequeña cantidad de alimentos sólidos, marilyn galletas saladas o farida tostada. Smiley ayuda a prevenir las náuseas.  Cuándo llamar a gray proveedor de atención médica   Llame de inmediato a gray proveedor de atención médica si nota alguno de los siguientes síntomas:   Sigue teniendo mucho dolor, o el dolor empeora, después de jimi el medicamento. Puede que el medicamento no sea lo suficientemente elio. O davie, puede alysa complicaciones de la cirugía.  Se siente demasiado somnoliento, mareado o adormecido. Quizás el medicamento sea demasiado elio.  Tiene efectos secundarios, marilyn náuseas o vómitos. Gray proveedor de atención médica puede recomendarle jimi  otros medicamentos.  Tiene cambios en la piel, marilyn sarpullido, picazón o urticaria. Dieterich puede significar que tiene farida reacción alérgica. Gray proveedor puede recomendarle jimi otros medicamentos.  La incisión tiene un aspecto diferente (por ejemplo, se abre farida parte).  Tiene sangrado o supuración de líquido de la herida y no le dijeron que eso era esperable.  Fiebre de 100.4 °F (38 °C) o más, o según le indique gray proveedor.  Cuándo llamar al 911  Llame al  911  de inmediato si tiene:   Dificultad para respirar  Cande hinchada    Si tiene apnea del sueño obstructiva   Jamey la cirugía, le administraron anestesia para que esté cómodo y no sienta dolor. Después de la cirugía, es probable que tenga más ataques de apnea causados por la anestesia y otros medicamentos que le administraron. Los ataques pueden durar más de lo habitual.    En gray casa, davin lo siguiente:  Cuando duerma, siga usando gray dispositivo de presión positiva continua en las vías respiratorias (CPAP, por swetha siglas en inglés). A menos que gray proveedor de atención médica le indique lo contrario, úselo siempre que duerma, ya sea de día o de noche. El dispositivo de CPAP suele usarse para tratar la apnea obstructiva del sueño.  Consulte a gray proveedor antes de jimi cualquier analgésico, relajante muscular o sedante. Gray proveedor le dará información sobre los peligros de jimi estos medicamentos.  Comuníquese con gray proveedor si tiene el sueño demasiado alterado, incluso cuando esté tomando los medicamentos según las instrucciones.  This information is for informational purposes only. This is not intended to be a substitute for professional medical advice, diagnosis, or treatment. Always seek the advice and follow the directions from your physician or other qualified health care provider.  © 0411-3538 [employer]. Todos los derechos reservados. Esta información no pretende sustituir la atención médica profesional. Sólo gray médico puede  diagnosticar y tratar un problema de jorge.    Home Care Instructions  LAPAROSCOPIC CHOLECYSTECTOMY      1. You have incisions with absorbable sutures underneath the skin so no suture removal are needed.      2. You can shower the day after surgery.  The incisions can get wet with water and soap.  Just pat your incisions dry after showering.  Avoid soaking in a bath tub for one week.  Avoid heavy lifting (greater than 10 lbs or anything heavier than a gallon of milk) for six weeks after surgery.    3. Be up and around when you are home.  The more you walk, the faster your recovery will be.    4. You may have an abdominal binder (medical girdle).  Wear it when you are up and moving around.  The bottom of the binder should cover a little of your hip bones to provide support to your lower abdomen.  Avoid wearing the binder too high as it may make your discomfort worse.    5. Take pain medications around the clock for the first few days after surgery regardless if you have pain or not.  The pain medications take about 30 minutes to work so if you wait until you experience pain, then you might be uncomfortable during those 30 minutes.    6.  A well known side effect of pain medication is constipation.  It is the number 1, 2, and 3 reason why patients call me after surgery.  Adequate hydration and stool softeners are ways to minimize the risk of constipation after surgery.  Drink a lot of fluid when you are at home.  Check your urine color.  If it's dark, then you are dehydrated and need to drink more water.  Take as many stool softeners (morning, noon, evening) as you need to have about one bowel movement a day.  Don't let four or five days go by without a bowel movement.  If that occurs, then you might need a rectal suppository or an enema to treat the constipation.    7.  You may drive when you are no longer taking prescription pain medications with narcotics.  If your degree of discomfort is minimal, extra strength  tylenol alternating with ibuprofen could be used as necessary.    8.  Please contact the office ([phone]) to schedule a telephone visit approximately two weeks after surgery.  At that time, if there are any issues, then we will schedule an in person clinic visit.    9. Signs and symptoms of post-operative problems include abdominal pain associated with nausea and/or vomiting, fever, chills, excessive drainage or pain at the incision sites, leg swelling/pain, or chest pain. Contact our office immediately if these signs or symptoms occur.    10. It is not unusual for you to experience pain similar to the “gallbladder attacks” that you had pre-operatively. Diarrhea can also occur, as well as persistent food intolerance. These symptoms are usually self limiting and will resolve on their own in several weeks.    11. If you have any problems or questions please contact me at any time day or night.  My cell phone number is [phone].

## 2025-06-11 ENCOUNTER — HOSPITAL ENCOUNTER (OUTPATIENT)
Facility: HOSPITAL | Age: 35
Setting detail: HOSPITAL OUTPATIENT SURGERY
Discharge: HOME | End: 2025-06-11
Attending: SURGERY | Admitting: SURGERY
Payer: MEDICAID

## 2025-06-11 ENCOUNTER — ANESTHESIA EVENT (OUTPATIENT)
Dept: SURGERY | Facility: HOSPITAL | Age: 35
End: 2025-06-11
Payer: MEDICAID

## 2025-06-11 ENCOUNTER — ANESTHESIA (OUTPATIENT)
Dept: SURGERY | Facility: HOSPITAL | Age: 35
End: 2025-06-11
Payer: MEDICAID

## 2025-06-11 VITALS
HEIGHT: 65 IN | TEMPERATURE: 98 F | RESPIRATION RATE: 14 BRPM | WEIGHT: 193 LBS | BODY MASS INDEX: 32.15 KG/M2 | DIASTOLIC BLOOD PRESSURE: 83 MMHG | SYSTOLIC BLOOD PRESSURE: 124 MMHG | OXYGEN SATURATION: 95 % | HEART RATE: 91 BPM

## 2025-06-11 DIAGNOSIS — K80.20 CALCULUS OF GALLBLADDER WITHOUT CHOLECYSTITIS WITHOUT OBSTRUCTION: ICD-10-CM

## 2025-06-11 LAB — B-HCG UR QL: NEGATIVE

## 2025-06-11 PROCEDURE — 47562 LAPAROSCOPIC CHOLECYSTECTOMY: CPT | Performed by: SURGERY

## 2025-06-11 RX ORDER — MIDAZOLAM HYDROCHLORIDE 1 MG/ML
INJECTION INTRAMUSCULAR; INTRAVENOUS AS NEEDED
Status: DISCONTINUED | OUTPATIENT
Start: 2025-06-11 | End: 2025-06-11 | Stop reason: SURG

## 2025-06-11 RX ORDER — ONDANSETRON 2 MG/ML
INJECTION INTRAMUSCULAR; INTRAVENOUS AS NEEDED
Status: DISCONTINUED | OUTPATIENT
Start: 2025-06-11 | End: 2025-06-11 | Stop reason: SURG

## 2025-06-11 RX ORDER — BUPIVACAINE HYDROCHLORIDE 5 MG/ML
INJECTION, SOLUTION EPIDURAL; INTRACAUDAL; PERINEURAL AS NEEDED
Status: DISCONTINUED | OUTPATIENT
Start: 2025-06-11 | End: 2025-06-11 | Stop reason: HOSPADM

## 2025-06-11 RX ORDER — LIDOCAINE HYDROCHLORIDE 10 MG/ML
INJECTION, SOLUTION EPIDURAL; INFILTRATION; INTRACAUDAL; PERINEURAL AS NEEDED
Status: DISCONTINUED | OUTPATIENT
Start: 2025-06-11 | End: 2025-06-11 | Stop reason: SURG

## 2025-06-11 RX ORDER — FAMOTIDINE 20 MG/1
20 TABLET, FILM COATED ORAL ONCE
Status: COMPLETED | OUTPATIENT
Start: 2025-06-11 | End: 2025-06-11

## 2025-06-11 RX ORDER — MORPHINE SULFATE 4 MG/ML
2 INJECTION, SOLUTION INTRAMUSCULAR; INTRAVENOUS EVERY 10 MIN PRN
Status: DISCONTINUED | OUTPATIENT
Start: 2025-06-11 | End: 2025-06-11

## 2025-06-11 RX ORDER — PROCHLORPERAZINE EDISYLATE 5 MG/ML
5 INJECTION INTRAMUSCULAR; INTRAVENOUS EVERY 8 HOURS PRN
Status: DISCONTINUED | OUTPATIENT
Start: 2025-06-11 | End: 2025-06-11

## 2025-06-11 RX ORDER — HYDROMORPHONE HYDROCHLORIDE 1 MG/ML
0.6 INJECTION, SOLUTION INTRAMUSCULAR; INTRAVENOUS; SUBCUTANEOUS EVERY 5 MIN PRN
Status: DISCONTINUED | OUTPATIENT
Start: 2025-06-11 | End: 2025-06-11

## 2025-06-11 RX ORDER — MORPHINE SULFATE 10 MG/ML
6 INJECTION, SOLUTION INTRAMUSCULAR; INTRAVENOUS EVERY 10 MIN PRN
Status: DISCONTINUED | OUTPATIENT
Start: 2025-06-11 | End: 2025-06-11

## 2025-06-11 RX ORDER — FAMOTIDINE 10 MG/ML
20 INJECTION, SOLUTION INTRAVENOUS ONCE
Status: COMPLETED | OUTPATIENT
Start: 2025-06-11 | End: 2025-06-11

## 2025-06-11 RX ORDER — ONDANSETRON 4 MG/1
4 TABLET, FILM COATED ORAL EVERY 8 HOURS PRN
Qty: 15 TABLET | Refills: 0 | Status: SHIPPED | OUTPATIENT
Start: 2025-06-11

## 2025-06-11 RX ORDER — ACETAMINOPHEN 500 MG
1000 TABLET ORAL ONCE
Status: COMPLETED | OUTPATIENT
Start: 2025-06-11 | End: 2025-06-11

## 2025-06-11 RX ORDER — SODIUM CHLORIDE, SODIUM LACTATE, POTASSIUM CHLORIDE, CALCIUM CHLORIDE 600; 310; 30; 20 MG/100ML; MG/100ML; MG/100ML; MG/100ML
INJECTION, SOLUTION INTRAVENOUS CONTINUOUS
Status: DISCONTINUED | OUTPATIENT
Start: 2025-06-11 | End: 2025-06-11

## 2025-06-11 RX ORDER — HYDROMORPHONE HYDROCHLORIDE 1 MG/ML
0.4 INJECTION, SOLUTION INTRAMUSCULAR; INTRAVENOUS; SUBCUTANEOUS EVERY 5 MIN PRN
Status: DISCONTINUED | OUTPATIENT
Start: 2025-06-11 | End: 2025-06-11

## 2025-06-11 RX ORDER — HYDROCODONE BITARTRATE AND ACETAMINOPHEN 5; 325 MG/1; MG/1
1 TABLET ORAL EVERY 6 HOURS PRN
Qty: 20 TABLET | Refills: 0 | Status: SHIPPED | OUTPATIENT
Start: 2025-06-11

## 2025-06-11 RX ORDER — HYDROCODONE BITARTRATE AND ACETAMINOPHEN 5; 325 MG/1; MG/1
1 TABLET ORAL ONCE
Refills: 0 | Status: COMPLETED | OUTPATIENT
Start: 2025-06-11 | End: 2025-06-11

## 2025-06-11 RX ORDER — KETOROLAC TROMETHAMINE 30 MG/ML
INJECTION, SOLUTION INTRAMUSCULAR; INTRAVENOUS AS NEEDED
Status: DISCONTINUED | OUTPATIENT
Start: 2025-06-11 | End: 2025-06-11 | Stop reason: SURG

## 2025-06-11 RX ORDER — METOCLOPRAMIDE 10 MG/1
10 TABLET ORAL ONCE
Status: COMPLETED | OUTPATIENT
Start: 2025-06-11 | End: 2025-06-11

## 2025-06-11 RX ORDER — ONDANSETRON 2 MG/ML
4 INJECTION INTRAMUSCULAR; INTRAVENOUS EVERY 6 HOURS PRN
Status: DISCONTINUED | OUTPATIENT
Start: 2025-06-11 | End: 2025-06-11

## 2025-06-11 RX ORDER — DEXAMETHASONE SODIUM PHOSPHATE 4 MG/ML
VIAL (ML) INJECTION AS NEEDED
Status: DISCONTINUED | OUTPATIENT
Start: 2025-06-11 | End: 2025-06-11 | Stop reason: SURG

## 2025-06-11 RX ORDER — POLYETHYLENE GLYCOL 3350 17 G/17G
17 POWDER, FOR SOLUTION ORAL DAILY
Qty: 14 PACKET | Refills: 0 | Status: SHIPPED | OUTPATIENT
Start: 2025-06-11 | End: 2025-06-25

## 2025-06-11 RX ORDER — METOCLOPRAMIDE HYDROCHLORIDE 5 MG/ML
10 INJECTION INTRAMUSCULAR; INTRAVENOUS ONCE
Status: COMPLETED | OUTPATIENT
Start: 2025-06-11 | End: 2025-06-11

## 2025-06-11 RX ORDER — HYDROMORPHONE HYDROCHLORIDE 1 MG/ML
0.2 INJECTION, SOLUTION INTRAMUSCULAR; INTRAVENOUS; SUBCUTANEOUS EVERY 5 MIN PRN
Status: DISCONTINUED | OUTPATIENT
Start: 2025-06-11 | End: 2025-06-11

## 2025-06-11 RX ORDER — NALOXONE HYDROCHLORIDE 0.4 MG/ML
80 INJECTION, SOLUTION INTRAMUSCULAR; INTRAVENOUS; SUBCUTANEOUS AS NEEDED
Status: DISCONTINUED | OUTPATIENT
Start: 2025-06-11 | End: 2025-06-11

## 2025-06-11 RX ORDER — MORPHINE SULFATE 4 MG/ML
4 INJECTION, SOLUTION INTRAMUSCULAR; INTRAVENOUS EVERY 10 MIN PRN
Status: DISCONTINUED | OUTPATIENT
Start: 2025-06-11 | End: 2025-06-11

## 2025-06-11 RX ADMIN — LIDOCAINE HYDROCHLORIDE 50 MG: 10 INJECTION, SOLUTION EPIDURAL; INFILTRATION; INTRACAUDAL; PERINEURAL at 10:50:00

## 2025-06-11 RX ADMIN — SODIUM CHLORIDE, SODIUM LACTATE, POTASSIUM CHLORIDE, CALCIUM CHLORIDE: 600; 310; 30; 20 INJECTION, SOLUTION INTRAVENOUS at 11:34:00

## 2025-06-11 RX ADMIN — ONDANSETRON 4 MG: 2 INJECTION INTRAMUSCULAR; INTRAVENOUS at 10:48:00

## 2025-06-11 RX ADMIN — MIDAZOLAM HYDROCHLORIDE 2 MG: 1 INJECTION INTRAMUSCULAR; INTRAVENOUS at 10:48:00

## 2025-06-11 RX ADMIN — DEXAMETHASONE SODIUM PHOSPHATE 8 MG: 4 MG/ML VIAL (ML) INJECTION at 10:56:00

## 2025-06-11 RX ADMIN — SODIUM CHLORIDE, SODIUM LACTATE, POTASSIUM CHLORIDE, CALCIUM CHLORIDE: 600; 310; 30; 20 INJECTION, SOLUTION INTRAVENOUS at 10:47:00

## 2025-06-11 RX ADMIN — KETOROLAC TROMETHAMINE 30 MG: 30 INJECTION, SOLUTION INTRAMUSCULAR; INTRAVENOUS at 11:34:00

## 2025-06-11 NOTE — ANESTHESIA PROCEDURE NOTES
Airway  Date/Time: 6/11/2025 10:52 AM  Reason: Elective      General Information and Staff   Patient location during procedure: OR  Anesthesiologist: Bobbi Castellanos MD  Resident/CRNA: Katia Yadav CRNA  Performed: CRNA   Performed by: Katia Yadav CRNA  Authorized by: Katia Yadav CRNA        Indications and Patient Condition  Indications for airway management: anesthesia  Sedation level: deep      Preoxygenated: yesPatient position: sniffing    Mask difficulty assessment: 1 - vent by mask    Final Airway Details    Final airway type: endotracheal airway    Successful airway: ETT  Cuffed: yes   Successful intubation technique: direct laryngoscopy  Endotracheal tube insertion site: oral  Blade: Irving  Blade size: #2  ETT size (mm): 7.0    Cormack-Lehane Classification: grade I - full view of glottis  Placement verified by: capnometry   Measured from: teeth  Number of attempts at approach: 1    Additional Comments  Dentition as preop

## 2025-06-11 NOTE — INTERVAL H&P NOTE
Pre-op Diagnosis: Calculus of gallbladder without cholecystitis without obstruction [K80.20]    The above referenced H&P was reviewed by Remberto Rodgers MD on 6/11/2025, the patient was examined and no significant changes have occurred in the patient's condition since the H&P was performed.  I discussed with the patient and/or legal representative the potential benefits, risks and side effects of this procedure; the likelihood of the patient achieving goals; and potential problems that might occur during recuperation.  I discussed reasonable alternatives to the procedure, including risks, benefits and side effects related to the alternatives and risks related to not receiving this procedure.  We will proceed with procedure as planned.

## 2025-06-11 NOTE — ANESTHESIA PREPROCEDURE EVALUATION
Anesthesia PreOp Note    HPI:     Cindy Reyes is a 34 year old female who presents for preoperative consultation requested by: Remberto Rodgers MD    Date of Surgery: 6/11/2025    Procedure(s):  Laparoscopic cholecystectomy possible open  Indication: Calculus of gallbladder without cholecystitis without obstruction [K80.20]    Relevant Problems   No relevant active problems       NPO:  Last Liquid Consumption Date: 06/10/25  Last Liquid Consumption Time: 2200  Last Solid Consumption Date: 06/10/25  Last Solid Consumption Time: 1900  Last Liquid Consumption Date: 06/10/25          History Review:  Patient Active Problem List    Diagnosis Date Noted    Calculus of gallbladder without cholecystitis without obstruction 05/27/2025    Breast pain, right 04/29/2021       Past Medical History[1]    Past Surgical History[2]    Prescriptions Prior to Admission[3]  Current Medications and Prescriptions Ordered in Epic[4]    Allergies[5]    Family History[6]  Social Hx on file[7]    Available pre-op labs reviewed.  Lab Results   Component Value Date    WBC 12.4 (H) 05/20/2025    RBC 4.57 05/20/2025    HGB 15.1 05/20/2025    HCT 43.8 05/20/2025    MCV 95.8 05/20/2025    MCH 33.0 05/20/2025    MCHC 34.5 05/20/2025    RDW 12.5 05/20/2025    .0 05/20/2025    URINEPREG Negative 06/11/2025     Lab Results   Component Value Date     05/20/2025    K 3.9 05/20/2025     05/20/2025    CO2 26.0 05/20/2025    BUN 12 05/20/2025    CREATSERUM 0.72 05/20/2025     (H) 05/20/2025    CA 9.4 05/20/2025          Vital Signs:  Body mass index is 32.12 kg/m².   height is 1.651 m (5' 5\") and weight is 87.5 kg (193 lb). Her oral temperature is 98.9 °F (37.2 °C). Her blood pressure is 123/87 and her pulse is 84. Her respiration is 16 and oxygen saturation is 98%.   Vitals:    06/09/25 1600 06/11/25 0827   BP:  123/87   Pulse:  84   Resp:  16   Temp:  98.9 °F (37.2 °C)   TempSrc:  Oral   SpO2:  98%   Weight: 88.5 kg (195 lb) 87.5 kg  (193 lb)   Height: 1.651 m (5' 5\") 1.651 m (5' 5\")        Anesthesia Evaluation     Patient summary reviewed    Airway   Mallampati: II  TM distance: >3 FB  Neck ROM: full  Dental - Dentition appears grossly intact     Pulmonary - negative ROS and normal exam   Cardiovascular - normal exam    Neuro/Psych - negative ROS     GI/Hepatic/Renal - negative ROS     Endo/Other - negative ROS   Abdominal                  Anesthesia Plan:   ASA:  2  Plan:   General  Airway:  ETT  Post-op Pain Management: IV analgesics and Oral pain medication      I have informed Cindy Reyes and/or legal guardian or family member of the nature of the anesthetic plan, benefits, risks including possible dental damage if relevant, major complications, and any alternative forms of anesthetic management.   All of the patient's questions were answered to the best of my ability. The patient desires the anesthetic management as planned.  Bobbi Castellanos MD  6/11/2025 10:14 AM  Present on Admission:  **None**           [1]   Past Medical History:   Mastitis, left, acute    Pregnant (HCC)   [2]   Past Surgical History:  Procedure Laterality Date    Appendectomy  07/20/2020   [3]   No medications prior to admission.   [4]   Current Facility-Administered Medications Ordered in Epic   Medication Dose Route Frequency Provider Last Rate Last Admin    lactated ringers infusion   Intravenous Continuous Ana Maria, MD Remberto 20 mL/hr at 06/11/25 0902 New Bag at 06/11/25 0902    ceFAZolin (Ancef) 2g in 10mL IV syringe premix  2 g Intravenous Once Remberto Rodgers MD         No current [place]-ordered outpatient medications on file.   [5] No Known Allergies  [6]   Family History  Problem Relation Age of Onset    No Known Problems Father     Hypertension Mother     Hypertension Maternal Grandmother     No Known Problems Paternal Grandmother     No Known Problems Paternal Grandfather    [7]   Social History  Socioeconomic History    Marital status:    Tobacco Use    Smoking  status: Never    Smokeless tobacco: Never   Vaping Use    Vaping status: Never Used   Substance and Sexual Activity    Alcohol use: Never    Drug use: Never

## 2025-06-11 NOTE — OPERATIVE REPORT
[facility]  part of [facility]     Operative Report    Patient Name:  Cindy Reyes  MR:  [id number]  :  1990  DOS:  25    Preop Dx:  Calculus of gallbladder without cholecystitis without obstruction [K80.20]  Postop Dx:  Calculus of gallbladder without cholecystitis without obstruction [K80.20]  Procedure:  Laparoscopic cholecystectomy  Surgeon:  Remberto Rodgers MD  PA: Tyler Ugalde PA-C, Kim Menjivar MD  EBL: 10 ml  Complication:  None    INDICATION:  Pt is a 34 year old female who with Calculus of gallbladder without cholecystitis without obstruction [K80.20] who is scheduled for a Laparoscopic cholecystectomy.    CONSENT:  An informed consent discussion was held with the patient regarding the nature of Calculus of gallbladder without cholecystitis without obstruction [K80.20], the treatment options and the details of the procedure.  The risks including but not limited to bleeding, wound infection, intra-abdominal infection, injury to the liver, colon, small intestine, pancreas, stomach, common bile duct, incomplete resection, cystic duct stump leak, retained stone and incisional hernia were discussed.  The patient expressed understanding and want to proceed with the planned procedure.    TECHNIQUE:  The patient was taken to the OR and placed in supine position.  General anesthesia was established and the abdomen was prepped in standard fashion.  Pneumoperitoneum was obtained using open technique through a supra-umbilical incision.  A 12-mm trocar was inserted under direct visualization and no injury occurred.  Examination of the abdomen showed a mildly thickened appearing gallbladder with a giant gallstone consistent with Calculus of gallbladder without cholecystitis without obstruction [K80.20].  Three 5-mm trocars were placed in the epigastric and right abdomen.  The patient was placed in reverse Trendelenburg position.  The fundus was grasped and retracted cephalad.  The  infundibulum was grasped and retracted inferior, anterior, and to the right.  The peritoneum along the medial and lateral aspect of the gallbladder/liver edge were incised using hook cautery.  The lower 1/3 of the gallbladder was dissected from the liver.  Two structures, identified as the cystic duct and artery, are seen entering the infundibulum, thus obtaining the so called \"critical view of safety\".  The cystic duct and artery were clipped and divided.  The gallbladder was detached from the liver using hook cautery and delivered from the abdomen using an endocatch bag.  The abdominal cavity was irrigated with saline and found to be hemostatic.  The trocars were removed under direct visualization and no port site bleeding was seen.  The supra-umbilical fascia was closed using 0 looped PDS.  The skin incisions were closed using 4-0 vicryl.  Sterile dressings were applied.  All instrument and sponge counts were correct.  I was present during the critical portions of the procedure.    Remberto Rodgers MD

## 2025-06-11 NOTE — ANESTHESIA POSTPROCEDURE EVALUATION
Patient: Cindy Reyes    Procedure Summary       Date: 06/11/25 Room / Location: [facility] MAIN OR 02 / [facility] MAIN OR    Anesthesia Start: 1047 Anesthesia Stop: 1153    Procedure: Laparoscopic cholecystectomy (Abdomen) Diagnosis:       Calculus of gallbladder without cholecystitis without obstruction      (Calculus of gallbladder without cholecystitis without obstruction [K80.20])    Surgeons: Remberto Rodgers MD Anesthesiologist: Bobbi Castellanos MD    Anesthesia Type: general ASA Status: 2            Anesthesia Type: general    Vitals Value Taken Time   /83 06/11/25 11:52   Temp 97.4 06/11/25 11:53   Pulse 73 06/11/25 11:53   Resp 0 06/11/25 11:53   SpO2 98 % 06/11/25 11:53   Vitals shown include unfiled device data.    [facility] AN Post Evaluation:   Patient Evaluated in PACU  Patient Participation: complete - patient cannot participate  Level of Consciousness: Post-procedure mental status: asleep.  Pain Score: 0  Pain Management: adequate  Airway Patency:patent  Dental exam unchanged from preop  Yes    Nausea/Vomiting: none  Cardiovascular Status: acceptable  Respiratory Status: acceptable  Postoperative Hydration acceptable      Katia Yadav CRNA  6/11/2025 11:53 AM

## 2025-06-16 ENCOUNTER — TELEPHONE (OUTPATIENT)
Dept: SURGERY | Facility: CLINIC | Age: 35
End: 2025-06-16

## 2025-06-16 NOTE — TELEPHONE ENCOUNTER
Patient states she has a little blood in her stool, advised patient this is not a symptom of gallbladder surgery and to call her PCP.  Patient agreed.

## 2025-06-16 NOTE — TELEPHONE ENCOUNTER
Per patient having blood in stool, asking if this is normal after surgery? Please call thank you. Norwegian speaker.

## 2025-06-16 NOTE — TELEPHONE ENCOUNTER
Patient had surgery done on 6/11 and would like to know for how many days she will need to be off from work.Please advise     Japanese speaker

## 2025-06-18 NOTE — TELEPHONE ENCOUNTER
Spoke to Arleen via language line [place] id # [id number].  Note for return to work will be mailed for light duty and she will be reevaluated on June30..  Pt verbalized understanding and has no questions at this time.

## 2025-06-20 ENCOUNTER — HOSPITAL ENCOUNTER (OUTPATIENT)
Age: 35
Discharge: HOME | End: 2025-06-20
Payer: MEDICAID

## 2025-06-20 ENCOUNTER — TELEPHONE (OUTPATIENT)
Dept: SURGERY | Facility: CLINIC | Age: 35
End: 2025-06-20

## 2025-06-20 VITALS
DIASTOLIC BLOOD PRESSURE: 73 MMHG | OXYGEN SATURATION: 99 % | HEART RATE: 75 BPM | SYSTOLIC BLOOD PRESSURE: 108 MMHG | TEMPERATURE: 98 F | RESPIRATION RATE: 18 BRPM

## 2025-06-20 DIAGNOSIS — T81.89XA PROBLEM INVOLVING SURGICAL INCISION: Primary | ICD-10-CM

## 2025-06-20 PROCEDURE — 99213 OFFICE O/P EST LOW 20 MIN: CPT | Performed by: NURSE PRACTITIONER

## 2025-06-20 NOTE — DISCHARGE INSTRUCTIONS
Las heridas se venkata ligeramente irritadas, clarence no infectadas. Puede aplicar vaselina dos veces al día. Llame a gray cirujano y dígale que lo atendieron aquí. Pregunte si anita verlo en la consulta antes del 30/6.      The wounds look slightly irritated but not infected.  You may apply Vaseline twice a day.  Call your surgeon and let them know that you were seen here.  Ask if they want to see you in the office before 6/30

## 2025-06-20 NOTE — ED PROVIDER NOTES
Patient Seen in: [facility] [place]       The following individual(s) verbally consented to be recorded using ambient AI listening technology and understand that they can each withdraw their consent to this listening technology at any point by asking the clinician to turn off or pause the recording:    Patient name: Cindy Reyes        History  Chief Complaint   Patient presents with    Wound Recheck     Stated Complaint: -concerns about surgical incision    Subjective:   The history is provided by the patient. A  was used.        Cindy Reyes is a 34 year old female who presents with post-surgical incision irritation following a cholecystectomy.    She underwent a cholecystectomy on June 11, 2025, and is now experiencing irritation at the surgical incision sites. The incisions are leaking a yellow gel-like fluid and have developed 'water bumps' since the previous night.    She has not yet seen her surgeon post-discharge and has a follow-up appointment scheduled for June 30, 2025. She experiences a small amount of pain but is no longer taking pain medication. She is not currently on any antibiotics.    A nurse advised her to remove the tape over the wound to allow more air, but she is unsure about doing so and has not removed it.    No other symptoms such as fever or chills were reported.        Objective:     No pertinent past medical history.            No pertinent past surgical history.              No pertinent social history.            Review of Systems    Positive for stated complaint: -concerns about surgical incision  Other systems are as noted in HPI.  Constitutional and vital signs reviewed.      All other systems reviewed and negative except as noted above.      Physical Exam    ED Triage Vitals   BP 06/20/25 1652 108/73   Pulse 06/20/25 1652 75   Resp 06/20/25 1652 18   Temp 06/20/25 1701 98.1 °F (36.7 °C)   Temp src 06/20/25 1701 Oral   SpO2 06/20/25 1652 99 %   O2 Device  06/20/25 1652 None (Room air)       Current Vitals:   Vital Signs  BP: 108/73  Pulse: 75  Resp: 18  Temp: 98.1 °F (36.7 °C)  Temp src: Oral    Oxygen Therapy  SpO2: 99 %  O2 Device: None (Room air)            Physical Exam  Vitals and nursing note reviewed.   Constitutional:       General: She is not in acute distress.     Appearance: Normal appearance. She is not ill-appearing or toxic-appearing.   HENT:      Head: Normocephalic and atraumatic.      Nose: Nose normal.      Mouth/Throat:      Mouth: Mucous membranes are moist.      Pharynx: Oropharynx is clear.   Eyes:      Pupils: Pupils are equal, round, and reactive to light.   Cardiovascular:      Rate and Rhythm: Normal rate and regular rhythm.      Pulses: Normal pulses.   Pulmonary:      Effort: Pulmonary effort is normal. No respiratory distress.      Breath sounds: Normal breath sounds.      Comments: Lungs clear.  No adventitious lung sounds.  No distress.  No hypoxia.  Pulse ox 99% ra. Which is normal    Abdominal:      General: Abdomen is flat.      Palpations: Abdomen is soft.      Tenderness: There is no abdominal tenderness.   Musculoskeletal:         General: No signs of injury. Normal range of motion.      Cervical back: Normal range of motion and neck supple.   Skin:     General: Skin is warm and dry.      Capillary Refill: Capillary refill takes less than 2 seconds.      Comments: Postcholecystectomy surgical sites to abdomen.  Incision to epigastric area is slightly irritated, mildly erythematous.  Nontender.  No drainage.  No hematoma or palpable mass.  See media tab for picture   Neurological:      General: No focal deficit present.      Mental Status: She is alert and oriented to person, place, and time.      GCS: GCS eye subscore is 4. GCS verbal subscore is 5. GCS motor subscore is 6.   Psychiatric:         Mood and Affect: Mood normal.         Behavior: Behavior normal.         Thought Content: Thought content normal.         Judgment:  Judgment normal.         ED Course  Labs Reviewed - No data to display       MDM       Medical Decision Making  Differential diagnoses reflecting the complexity of care include: Status postcholecystectomy, wound check, wound infection  Patient concerned about irritation to her cholecystectomy surgical sites with clear drainage.  Wounds appear well-healing.  There is skin adhesive to the 4 sites.  The epigastric wound has a slight amount of erythema.  No drainage.  Nontender.  Looks to have a mild contact dermatitis.  No surrounding cellulitis.  No suspicion for abscess or hematoma.  More likely irritant contact dermatitis  Patient was attempting to pull off the skin adhesive with her fingers.  I encouraged her to stop doing that and to wait for the skin adhesive to fall off on its own    Plan of Care: She may apply Vaseline.  She needs wound check with her surgeon, she was encouraged to call tomorrow    Results and plan of care discussed with the patient/family. They are in agreement with discharge. They understand to follow up with their primary doctor or the referral physician for further evaluation, especially if no improvement.  Also discussed the limitations of immediate care, patient is aware that if symptoms are worse they should go to the emergency room. Verbal and written discharge instructions were given.     My independent interpretation of studies of: N/A  Diagnostic tests and medications considered but not ordered were: Abdominal imaging, wound culture  Shared decision making was done by: Patient was encouraged not to pull the skin adhesive off  Comorbidities that add complexity to management include: None  External chart review was done and was noted: Cholecystectomy 6/11.  She did call her surgeon today  History obtained by an independent source was from: N/A  Discussions and management was done with: N/A  Social determinants of health that affect care: Language barrier              Problems  Addressed:  Problem involving surgical incision: acute illness or injury        Disposition and Plan     Clinical Impression:  1. Problem involving surgical incision         Disposition:  Discharge  6/20/2025  5:02 pm    Follow-up:  Remberto Rodgers MD  [address] [address]  [phone]                Medications Prescribed:  Discharge Medication List as of 6/20/2025  5:19 PM                Supplementary Documentation:

## 2025-06-20 NOTE — TELEPHONE ENCOUNTER
Patient has a minimal amount of drainage yellow in color, still has dressings on her incisions.  Advised patient to remove the tegaderm dressings.  Patient stated she wanted to go to immediate care.  Advised her it is her choice to go if she wants.  Patient has a f/u appointment on 6/30.

## 2025-06-20 NOTE — TELEPHONE ENCOUNTER
Patient calling stating she had surgery done with Dr Remberto Rodgers on 6/11 and she is noticing yellow fluid on the area she had surgery done, would like to know if that is normal.Please advise       Bruneian speaker

## 2025-06-20 NOTE — ED INITIAL ASSESSMENT (HPI)
Cholecystectomy 6/11. Reports yellow drainage at incision site that started last night. Concerned for infection.

## 2025-06-24 ENCOUNTER — TELEPHONE (OUTPATIENT)
Dept: SURGERY | Facility: CLINIC | Age: 35
End: 2025-06-24

## 2025-06-24 NOTE — TELEPHONE ENCOUNTER
Patient calling stating she's still experiencing  yellow fluid on the incision .Please advise   Tajik speaker

## 2025-06-24 NOTE — TELEPHONE ENCOUNTER
Spoke to patient via  ID [id number]. She is still experiencing yellow fluid draining from incision site closest to breast. Denies fever, increasing pain, and incision site is red, and itchy. Advised keep clean and dry, cover with clean gauze, and paper tape. Patient scheduled for PO apt tomorrow @ 11:30 with PA.

## 2025-06-25 ENCOUNTER — OFFICE VISIT (OUTPATIENT)
Dept: SURGERY | Facility: CLINIC | Age: 35
End: 2025-06-25
Payer: MEDICAID

## 2025-06-25 VITALS — HEART RATE: 78 BPM | DIASTOLIC BLOOD PRESSURE: 61 MMHG | SYSTOLIC BLOOD PRESSURE: 96 MMHG

## 2025-06-25 DIAGNOSIS — L23.9 ALLERGIC DERMATITIS: ICD-10-CM

## 2025-06-25 DIAGNOSIS — Z48.89 ENCOUNTER FOR POSTOPERATIVE CARE: Primary | ICD-10-CM

## 2025-06-25 PROCEDURE — 99024 POSTOP FOLLOW-UP VISIT: CPT

## 2025-06-25 RX ORDER — HYDROCORTISONE 25 MG/G
1 CREAM TOPICAL 2 TIMES DAILY
Qty: 20 G | Refills: 0 | Status: SHIPPED | OUTPATIENT
Start: 2025-06-25

## 2025-06-25 NOTE — PROGRESS NOTES
Follow Up Visit Note       Active Problems      1. Encounter for postoperative care    2. Allergic dermatitis          Chief Complaint   Chief Complaint   Patient presents with    Post-Op     S/P Laparoscopic cholecystectomy on 6/11/2025. Pt c/o redness and draining on incisions. Pain level 2-3/10. Has diarrhea at times, denies urinary problems.          History of Present Illness  Cindy Reyes is a pleasant 34 year old year old patient presenting for post op appointment. Patient is Swedish speaking,  used. She is s/p laparoscopic cholecystectomy with Dr. Rodgers on 06/11/2025. Patient presents today because of increased redness and itching near incisions. Patient reports it started approximately 1 week ago. Notes little serous drainage from incisions. Has never had a reaction like this in the past. Denies fevers or chills.  She otherwise generally feels well, she denies abdominal pain. She is tolerating a general diet without diarrhea or constipation. She denies fever, chills, SOB, chest pain, leg swelling or calf tenderness.       Allergies  Arleen has no known allergies.    Past Medical / Surgical / Social / Family History    The past medical and past surgical history have been reviewed by me today.    Past Medical History[1]  Past Surgical History[2]    The family history and social history have been reviewed by me today.    Family History[3]  Social Hx on file[4]   Medications - Current[5]     Review of Systems  The Review of Systems has been reviewed by me during today.  Review of Systems   Constitutional:  Negative for appetite change, chills, fatigue and fever.   Gastrointestinal:  Negative for abdominal distention, abdominal pain, constipation, diarrhea, nausea and vomiting.        Physical Findings   BP 96/61   Pulse 78   LMP 05/30/2025 (Exact Date)   Physical Exam  Constitutional:       Appearance: Normal appearance. She is normal weight.   Cardiovascular:      Rate and Rhythm: Normal rate and  regular rhythm.      Pulses: Normal pulses.   Pulmonary:      Effort: Pulmonary effort is normal.      Breath sounds: Normal breath sounds.   Abdominal:      General: Abdomen is flat. A surgical scar is present. Bowel sounds are normal.      Palpations: Abdomen is soft.      Comments: Surgical incisions with erythema and blistering rash surrounding each incision. Incisions are clean, dry, and intact. No drainage noted. Allergic dermatitis due to skin adhesive.    Skin:     General: Skin is warm and dry.      Capillary Refill: Capillary refill takes less than 2 seconds.   Neurological:      Mental Status: She is alert.   Psychiatric:         Mood and Affect: Mood normal.         Behavior: Behavior normal.         Thought Content: Thought content normal.          Assessment   1. Encounter for postoperative care    2. Allergic dermatitis      Pathology reviewed with the patient    Plan   Allergic dermatitis due to skin adhesive.  Sent prescription for hydrocortisone cream. Apply to incisions over the rash 2-4 times daily. Keep the area clean and dry. Advised to use benadryl as needed for itching.  Continue avoiding heavy lifting for another 2 weeks  Continue good hygiene of incision site  OK to swim or take a bath  Continue small non-fatty, soft, bland meals and adequate hydration  Follow up as needed or if signs of worsening rash.       No orders of the defined types were placed in this encounter.      Imaging & Referrals   None    Follow Up  No follow-ups on file.    Frances Cormier PA-C     Patient seen and examined by myself. 100% clinical time and 100% MDM was performed by me.          [1]   Past Medical History:   Mastitis, left, acute    Pregnant (HCC)   [2]   Past Surgical History:  Procedure Laterality Date    Appendectomy  07/20/2020    Cholecystectomy  06/11/2025    Laparoscopic cholecystectomy   [3]   Family History  Problem Relation Age of Onset    No Known Problems Father     Hypertension Mother      Hypertension Maternal Grandmother     No Known Problems Paternal Grandmother     No Known Problems Paternal Grandfather    [4]   Social History  Socioeconomic History    Marital status:    Tobacco Use    Smoking status: Never    Smokeless tobacco: Never   Vaping Use    Vaping status: Never Used   Substance and Sexual Activity    Alcohol use: Never    Drug use: Never   [5]   Current Outpatient Medications:     hydrocortisone 2.5 % External Cream, Apply 1 drop topically 2 (two) times daily., Disp: 20 g, Rfl: 0    HYDROcodone-acetaminophen 5-325 MG Oral Tab, Take 1 tablet by mouth every 6 (six) hours as needed., Disp: 20 tablet, Rfl: 0    Polyethylene Glycol 3350 (MIRALAX) 17 g Oral Powd Pack, Take 17 g by mouth daily for 14 days., Disp: 14 packet, Rfl: 0    ondansetron (ZOFRAN) 4 mg tablet, Take 1 tablet (4 mg total) by mouth every 8 (eight) hours as needed for Nausea., Disp: 15 tablet, Rfl: 0

## 2025-06-30 ENCOUNTER — OFFICE VISIT (OUTPATIENT)
Dept: SURGERY | Facility: CLINIC | Age: 35
End: 2025-06-30

## 2025-06-30 DIAGNOSIS — L24.89 IRRITANT CONTACT DERMATITIS DUE TO OTHER AGENTS: ICD-10-CM

## 2025-06-30 DIAGNOSIS — L01.00 IMPETIGO: ICD-10-CM

## 2025-06-30 DIAGNOSIS — Z48.89 ENCOUNTER FOR POSTOPERATIVE CARE: Primary | ICD-10-CM

## 2025-06-30 PROBLEM — K80.20 CALCULUS OF GALLBLADDER WITHOUT CHOLECYSTITIS WITHOUT OBSTRUCTION: Status: RESOLVED | Noted: 2025-05-27 | Resolved: 2025-06-30

## 2025-06-30 PROCEDURE — 99024 POSTOP FOLLOW-UP VISIT: CPT

## 2025-06-30 RX ORDER — CEPHALEXIN 500 MG/1
500 CAPSULE ORAL 4 TIMES DAILY
Qty: 28 CAPSULE | Refills: 0 | Status: SHIPPED | OUTPATIENT
Start: 2025-06-30 | End: 2025-07-07

## 2025-06-30 NOTE — PROGRESS NOTES
Follow Up Visit Note       Active Problems      1. Encounter for postoperative care    2. Impetigo    3. Irritant contact dermatitis due to other agents          Chief Complaint   Chief Complaint   Patient presents with    Post-Op     Pt here for post op s/p lap fernando oon 6/11/2025.  Pt states rash is worse and no she has a blister in the umbilical area.           History of Present Illness  Cindy Reyes is a pleasant 34 year old year old patient presenting for post op appointment. Patient is Danish speaking,  used. She is s/p laparoscopic cholecystectomy with Dr. Rodgers on 06/11/2025. Patient had initial post operative appointment 6/25 with complaints of rash and itching at incision sites, presumed due to adhesive dermabond. Patient was prescribed hydrocortisone and benadryl and instructed to FU    She reports since then, her rash has worsened. It is blistering, red, and itchy. Her umbilical site has a large blister and burns. It also has a foul odor. She used the hydrocortisone 3 times but noticed the blisters and stopped using the cream. She denies fever, chills, or fatigue. She does note dizziness which started a few days ago, is intermittent. The incision sites themselves are closed and there is serous drainage per patient.       Allergies  Arleen has no known allergies.    Past Medical / Surgical / Social / Family History    The past medical and past surgical history have been reviewed by me today.    Past Medical History[1]  Past Surgical History[2]    The family history and social history have been reviewed by me today.    Family History[3]  Social Hx on file[4]   Medications - Current[5]     Review of Systems  The Review of Systems has been reviewed by me during today.  Review of Systems   Constitutional:  Negative for appetite change, chills, fatigue and fever.   Gastrointestinal:  Negative for abdominal distention, abdominal pain, constipation, diarrhea, nausea and vomiting.   Skin:  Positive for  rash.        Physical Findings   LMP 05/30/2025 (Exact Date)   Physical Exam  Constitutional:       Appearance: Normal appearance. She is normal weight.   Cardiovascular:      Rate and Rhythm: Normal rate and regular rhythm.      Pulses: Normal pulses.   Pulmonary:      Effort: Pulmonary effort is normal.      Breath sounds: Normal breath sounds.   Abdominal:      General: Abdomen is flat. A surgical scar is present. Bowel sounds are normal.      Palpations: Abdomen is soft.      Comments: Surgical incisions with erythema and blistering rash surrounding each incision. Incisions are clean, dry, and intact. No drainage noted. Allergic dermatitis due to skin adhesive.    Skin:     General: Skin is warm and dry.      Capillary Refill: Capillary refill takes less than 2 seconds.             Comments: Blistering erythematous rash at all incision sites. Bullae present in umbilical incision with foul odor. Crusting present. Incisions still intact and closed.    Neurological:      Mental Status: She is alert.   Psychiatric:         Mood and Affect: Mood normal.         Behavior: Behavior normal.         Thought Content: Thought content normal.          Assessment   1. Encounter for postoperative care    2. Impetigo    3. Irritant contact dermatitis due to other agents      Likely superimposed infection secondary to allergic reaction    Plan   Order for oral antibiotics for impetigo. Complete entire course.  Apply hydrocortisone cream twice daily  No work restrictions  Continue good hygiene of incision site. Warm water and soap. Dressings provided for daily dressing changes.  Follow up Friday, sooner if worsening symptoms   Patient expresses understanding and in agreement with the plan.        No orders of the defined types were placed in this encounter.      Imaging & Referrals   None    Follow Up  No follow-ups on file.    Anabella Dotson PA-C     Patient seen and examined by myself. 100% clinical time and 100% MDM was  performed by me.          [1]   Past Medical History:   Calculus of gallbladder without cholecystitis without obstruction    Mastitis, left, acute    Pregnant (HCC)   [2]   Past Surgical History:  Procedure Laterality Date    Appendectomy  07/20/2020    Cholecystectomy  06/11/2025    Laparoscopic cholecystectomy   [3]   Family History  Problem Relation Age of Onset    No Known Problems Father     Hypertension Mother     Hypertension Maternal Grandmother     No Known Problems Paternal Grandmother     No Known Problems Paternal Grandfather    [4]   Social History  Socioeconomic History    Marital status:    Tobacco Use    Smoking status: Never    Smokeless tobacco: Never   Vaping Use    Vaping status: Never Used   Substance and Sexual Activity    Alcohol use: Never    Drug use: Never   [5]   Current Outpatient Medications:     cephALEXin 500 MG Oral Cap, Take 1 capsule (500 mg total) by mouth 4 (four) times daily for 7 days., Disp: 28 capsule, Rfl: 0    hydrocortisone 2.5 % External Cream, Apply 1 drop topically 2 (two) times daily., Disp: 20 g, Rfl: 0    ondansetron (ZOFRAN) 4 mg tablet, Take 1 tablet (4 mg total) by mouth every 8 (eight) hours as needed for Nausea., Disp: 15 tablet, Rfl: 0

## 2025-07-07 ENCOUNTER — OFFICE VISIT (OUTPATIENT)
Dept: SURGERY | Facility: CLINIC | Age: 35
End: 2025-07-07

## 2025-07-07 VITALS — DIASTOLIC BLOOD PRESSURE: 66 MMHG | SYSTOLIC BLOOD PRESSURE: 98 MMHG | HEART RATE: 66 BPM

## 2025-07-07 DIAGNOSIS — Z48.89 ENCOUNTER FOR POST SURGICAL WOUND CHECK: Primary | ICD-10-CM

## 2025-07-07 PROCEDURE — 99212 OFFICE O/P EST SF 10 MIN: CPT

## 2025-07-07 NOTE — PROGRESS NOTES
S:  Cindy Reyes is a 34 year old female presenting for wound check.  Completed course of antibiotics, feeling better. Wounds have healed, no fever or chills. No drainage from wounds.      O:  BP 98/66 (BP Location: Left arm, Patient Position: Sitting, Cuff Size: adult)   Pulse 66   LMP 05/30/2025 (Exact Date)   GEN:  No acute distress  L: nonlabored respirations  H: reg rate  Abd:  Soft, NT,ND.  Skin: Incision C D I. Some mild inflammation around incisions, otherwise healing well.  Extr: No edema, no calf tenderness    Assessment   1. Encounter for post surgical wound check      Doing well post op  Continue to keep incision clean and dry.  Maintain a healthy diet.  Maintain good hydration.  F/u prn.         Tyler Ugalde PA-C

## 2025-07-11 ENCOUNTER — TELEPHONE (OUTPATIENT)
Dept: SURGERY | Facility: CLINIC | Age: 35
End: 2025-07-11

## 2025-07-11 NOTE — TELEPHONE ENCOUNTER
Patient stating she started to have some leakage and itchiness from the in incision.Please advise     Sinhala speaker

## (undated) DEVICE — TROCAR: Brand: KII FIOS FIRST ENTRY

## (undated) DEVICE — TROCAR: Brand: KII® SLEEVE

## (undated) DEVICE — LAP CHOLE: Brand: MEDLINE INDUSTRIES, INC.

## (undated) DEVICE — ETS45 RELOAD STANDARD 45MM: Brand: ENDOPATH

## (undated) DEVICE — SUTURE VICRYL 0 UR-6

## (undated) DEVICE — GAMMEX® PI HYBRID SIZE 7.5, STERILE POWDER-FREE SURGICAL GLOVE, POLYISOPRENE AND NEOPRENE BLEND: Brand: GAMMEX

## (undated) DEVICE — ENDOPATH ETS45 2.5MM RELOADS (VASCULAR/THIN): Brand: ENDOPATH

## (undated) DEVICE — ENCORE® LATEX ACCLAIM SIZE 8, STERILE LATEX POWDER-FREE SURGICAL GLOVE: Brand: ENCORE

## (undated) DEVICE — TISSUE RETRIEVAL SYSTEM: Brand: INZII RETRIEVAL SYSTEM

## (undated) DEVICE — SUT MCRYL 4-0 18IN PS-2 ABSRB UD 19MM 3/8 CIR

## (undated) DEVICE — INSULATED BLADE ELECTRODE: Brand: EDGE

## (undated) DEVICE — ENDOPATH ETS-FLEX45 ARTICULATING ENDOSCOPIC LINEAR CUTTER, NO RELOAD: Brand: ENDOPATH

## (undated) DEVICE — CLIP APPLIER WITH CLIP LOGIC TECHNOLOGY: Brand: ENDO CLIP III

## (undated) DEVICE — SOLUTION IV 1000ML 0.9% NACL PRESERVATIVE

## (undated) DEVICE — SOL  .9 1000ML BTL

## (undated) DEVICE — TROCAR 12MM L100 HASSAN NON BLADED W/BALLOON

## (undated) DEVICE — SUT PDS II 0 L60IN ABSRB VLT L48MM CTX 1/2

## (undated) DEVICE — DRAPE SHEET LAPCHOLE 124X100X7

## (undated) DEVICE — [HIGH FLOW INSUFFLATOR,  DO NOT USE IF PACKAGE IS DAMAGED,  KEEP DRY,  KEEP AWAY FROM SUNLIGHT,  PROTECT FROM HEAT AND RADIOACTIVE SOURCES.]: Brand: PNEUMOSURE

## (undated) DEVICE — ENCORE® LATEX MICRO SIZE 8, STERILE LATEX POWDER-FREE SURGICAL GLOVE: Brand: ENCORE

## (undated) DEVICE — SOLUTION IRRIG 1000ML ST H2O AQUALITE PLAS

## (undated) DEVICE — SOL  .9 3000ML

## (undated) DEVICE — SUT COAT VCRL+ 0 27IN UR-6 ABSRB VLT ANTIBACT

## (undated) DEVICE — SOLUTION IRRIG 1000ML 0.9% NACL USP BTL

## (undated) DEVICE — BINDER ABD L/XL H12XL62IN 4 PNL UNIV PREM

## (undated) DEVICE — PLUMEPORT ACTIV LAPAROSCOPIC SMOKE FILTRATION DEVICE: Brand: PLUMEPORT ACTIVE

## (undated) DEVICE — UNDYED BRAIDED (POLYGLACTIN 910), SYNTHETIC ABSORBABLE SUTURE: Brand: COATED VICRYL

## (undated) DEVICE — EXOFIN PRECISION PEN HIGH VISCOSITY TOPICAL SKIN ADHESIVE: Brand: EXOFIN PRECISION PEN, 1G

## (undated) NOTE — LETTER
9/17/2020              [address] [place] [address] [address]        [facility] [address]         To Whom It May Concern:      Kamla Pradhan is currently under our care for pregnancy.  It is permissible at her gestational age for dental work to be per

## (undated) NOTE — LETTER
6/17/2020              Arleen [address] [place] [address]         To Whom It May Concern:      Siri Wallace is currently under my medical care due to pregnancy. She has an estimated due date of January 14, 2021.   If y

## (undated) NOTE — LETTER
[address], [place], [place]  Autorización para operación y procedimiento quirúrgico   Fecha:___________                                                                                                         Hora:________ resultado de recibir farida transfusión de [place] y/o productos sanguíneos.   A continuación se mencionan algunos, aunque no todos, los riesgos potenciales que pueden ocurrir: fiebre y reacciones alérgicas, reacciones hemolíticas, trasmisión de enfermedades co 9. Si tengo farida orden de No intentar la reanimación (NAFISA), dutch estado se suspenderá mientras esté en el quirófano, la eliel de procedimientos y alfredo el periodo de recuperación a menos que yo indique lo contrario explícitamente (o Francia Corine persona Lenoria Fogo a le(s) he explicado los riesgos y beneficios involucradosen el rechazo del tratarniento propuesto y alternativas al tratamiento propuesto, y he respondido a las preguntas del(la) paciente(My signature below affirms that prior to the time of the procedure, I

## (undated) NOTE — LETTER
[facility] 4W/SW/SE  [address] [place] [address]  Dept: [phone]  Loc: [phone]      July 21, 2020    Patient: Zoraida San   Date of Visit: 7/20/2020       To Whom It May Concern:    Zoraida San was seen and treated in our hosp

## (undated) NOTE — LETTER
6/25/2025          To Whom It May Concern:    Cindy Reyes is currently under my medical care.  She may return to work on 6/26/2025.  Activity is restricted as follows: light duty, no lifting over 15 pounds, no strenuous activity until 7/10/2025.    If you require additional information please contact our office.        Sincerely,          [facility]          Document generated by:  ALISSA SAHU

## (undated) NOTE — LETTER
6/18/2025          To Whom It May Concern:    Cindy Reyes  may return to work on June 25th light duty:  no lifting or  pulling over 15 lbs and no strenuous activity. .  She will be reevaluated on June 30.    If you require additional information please contact our office.        Sincerely,          Remberto Rodgers MD          Document generated by:  NW

## (undated) NOTE — LETTER
[facility]   Administracion de Trae Bergman, O ___________________________________ [place] gomez Lazcano se me                              (Representante)    administre anestesia alfredo el procedimiento, operacion o tr presion, dificultad al orinar, dolor de fernando, disminucion del ritmo cardiaco del julio. Riesgos remotos incluyen: infecciones, alto bloqueo coppola, sangramiento del canal coppola, convulciones, parocardiaco y [place].   6. CONCIENCIA: Comprendo que es posib (Firma del Testigo)                                                                       ___________________________________________     ___________________________________________________

## (undated) NOTE — LETTER
[address] [facility], [place], [place]  Autorización para operación y procedimiento quirúrgico   Fecha:12/23/20___________                                                                                                         Gianna Colon: 4. En julia de que surja la necesidad alfredo mi operación o alfredo el periodo postoperatorio, también autorizo se aplique pily y/o productos sanguíneos.   Asimismo, entiendo que a [place] de las [address] y análisis de [place] o de los productos sa 6. Doy consentimiento para que se fotografíen o graben vídeos de las operaciones o procedimientos a realizarse, Mikayla Gaurav GregGavin Scott 1615 las partes de mi cuerpo que nicolette Las vagas para propósitos médicos, científicos o educativos, en el entendido de que, mi identidad no CERTIFICO QUE HE LEÍDO Y COMPRENDIDO EL CONSENTIMIENTO ANTERIOR PARA LA OPERACIÓN y/o [facility].    ________________________________________  __________________________________  Audrea Labrador del paciente      Firma de la persona responsable

## (undated) NOTE — LETTER
[facility]   Administracion de Carejessica Savage, O ___________________________________ Marikagomez Wong se me                              (Representante)    administre anestesia alfredo el procedimiento, operacion o tr 5. Pacientes de obstetricia (maternidad) - Riesgos y consecuencias especificas de la anestesia coppola o epiduralpueden incluir comeson baja presion, dificultad al orinar, dolor de fernando, disminucion del ritmo cardiaco del julio. Riesgos remotos incluyen: i __________________________________________________________    _________________________________________  ([address] Owen,[address] [place] [address]                                                                                                                          [place] tong

## (undated) NOTE — LETTER
5/27/2025              Cindy Reyes        [address]        [facility] [address]         To Whom it may concern:    This is to certify that Cindy Reyes had an appointment on 5/27/2025 at 1:10 PM with Remberto Rodgers MD.        Sincerely,    Remberto Rodgers MD  [address] [id number] [phone]        Document electronically generated by:  LEXI

## (undated) NOTE — LETTER
[address]      Authorization for Surgical Operation and Procedure     Date:___12/23/20________                                                                                                         Time 4.   Should the need arise during my operation or immediate post-operative period, I also consent to the administration of blood and/or blood products.   Further, I understand that despite careful testing and screening of blood or blood products by cherri 8.   I recognize that in the event my procedure results in extended X-Ray/fluoroscopy time, I may develop a skin reaction. 9.  If I have a Do Not Attempt Resuscitation (DNAR) order in place, that status will be suspended while in the operating room, proc STATEMENT OF PHYSICIAN My signature below affirms that prior to the time of the procedure; I have explained to the patient and/or his/her legal representative, the risks and benefits involved in the proposed treatment and any reasonable alternative to the

## (undated) NOTE — LETTER
3/17/2021              [address] [place] [id number]         To Whom It May Concern,    The above named patient is currently under my care. She has been cleared to return to work.  Feel free to contact my office